# Patient Record
Sex: MALE | Race: WHITE | Employment: OTHER | URBAN - METROPOLITAN AREA
[De-identification: names, ages, dates, MRNs, and addresses within clinical notes are randomized per-mention and may not be internally consistent; named-entity substitution may affect disease eponyms.]

---

## 2020-02-18 ENCOUNTER — APPOINTMENT (OUTPATIENT)
Dept: GENERAL RADIOLOGY | Age: 70
DRG: 391 | End: 2020-02-18
Payer: MEDICARE

## 2020-02-18 ENCOUNTER — APPOINTMENT (OUTPATIENT)
Dept: CT IMAGING | Age: 70
DRG: 391 | End: 2020-02-18
Payer: MEDICARE

## 2020-02-18 ENCOUNTER — HOSPITAL ENCOUNTER (INPATIENT)
Age: 70
LOS: 2 days | Discharge: HOME | DRG: 391 | End: 2020-02-20
Attending: INTERNAL MEDICINE | Admitting: INTERNAL MEDICINE
Payer: MEDICARE

## 2020-02-18 PROBLEM — E87.20 METABOLIC ACIDEMIA: Status: ACTIVE | Noted: 2020-02-18

## 2020-02-18 LAB
ALBUMIN SERPL-MCNC: 4.6 G/DL (ref 3.4–5)
ALP BLD-CCNC: 89 U/L (ref 40–129)
ALT SERPL-CCNC: 19 U/L (ref 10–40)
ANION GAP SERPL CALCULATED.3IONS-SCNC: 17 MMOL/L (ref 3–16)
ANION GAP SERPL CALCULATED.3IONS-SCNC: 21 MMOL/L (ref 3–16)
AST SERPL-CCNC: 16 U/L (ref 15–37)
BASE EXCESS VENOUS: -6.5 MMOL/L
BASOPHILS ABSOLUTE: 0 K/UL (ref 0–0.2)
BASOPHILS RELATIVE PERCENT: 0.3 %
BETA-HYDROXYBUTYRATE: 1.95 MMOL/L (ref 0–0.27)
BILIRUB SERPL-MCNC: 0.4 MG/DL (ref 0–1)
BILIRUBIN DIRECT: <0.2 MG/DL (ref 0–0.3)
BILIRUBIN URINE: NEGATIVE
BILIRUBIN, INDIRECT: NORMAL MG/DL (ref 0–1)
BLOOD, URINE: NEGATIVE
BUN BLDV-MCNC: 22 MG/DL (ref 7–20)
BUN BLDV-MCNC: 22 MG/DL (ref 7–20)
CALCIUM SERPL-MCNC: 8.7 MG/DL (ref 8.3–10.6)
CALCIUM SERPL-MCNC: 9.6 MG/DL (ref 8.3–10.6)
CARBOXYHEMOGLOBIN: 0.7 %
CHLORIDE BLD-SCNC: 105 MMOL/L (ref 99–110)
CHLORIDE BLD-SCNC: 107 MMOL/L (ref 99–110)
CLARITY: CLEAR
CO2: 15 MMOL/L (ref 21–32)
CO2: 17 MMOL/L (ref 21–32)
COLOR: YELLOW
CREAT SERPL-MCNC: 1.3 MG/DL (ref 0.8–1.3)
CREAT SERPL-MCNC: 1.3 MG/DL (ref 0.8–1.3)
EOSINOPHILS ABSOLUTE: 0 K/UL (ref 0–0.6)
EOSINOPHILS RELATIVE PERCENT: 0.1 %
EPITHELIAL CELLS, UA: 0 /HPF (ref 0–5)
GFR AFRICAN AMERICAN: >60
GFR AFRICAN AMERICAN: >60
GFR NON-AFRICAN AMERICAN: 55
GFR NON-AFRICAN AMERICAN: 55
GLUCOSE BLD-MCNC: 173 MG/DL (ref 70–99)
GLUCOSE BLD-MCNC: 178 MG/DL (ref 70–99)
GLUCOSE BLD-MCNC: 179 MG/DL (ref 70–99)
GLUCOSE BLD-MCNC: 182 MG/DL (ref 70–99)
GLUCOSE BLD-MCNC: 183 MG/DL (ref 70–99)
GLUCOSE BLD-MCNC: 212 MG/DL (ref 70–99)
GLUCOSE URINE: >=1000 MG/DL
HCO3 VENOUS: 20 MMOL/L (ref 23–29)
HCT VFR BLD CALC: 42.6 % (ref 40.5–52.5)
HEMOGLOBIN: 13.7 G/DL (ref 13.5–17.5)
HYALINE CASTS: 0 /LPF (ref 0–8)
KETONES, URINE: 40 MG/DL
LACTIC ACID: 1.4 MMOL/L (ref 0.4–2)
LEUKOCYTE ESTERASE, URINE: NEGATIVE
LIPASE: 40 U/L (ref 13–60)
LYMPHOCYTES ABSOLUTE: 0.5 K/UL (ref 1–5.1)
LYMPHOCYTES RELATIVE PERCENT: 3 %
MCH RBC QN AUTO: 25.9 PG (ref 26–34)
MCHC RBC AUTO-ENTMCNC: 32.2 G/DL (ref 31–36)
MCV RBC AUTO: 80.3 FL (ref 80–100)
METHEMOGLOBIN VENOUS: 1 %
MICROSCOPIC EXAMINATION: YES
MONOCYTES ABSOLUTE: 0.4 K/UL (ref 0–1.3)
MONOCYTES RELATIVE PERCENT: 2.7 %
NEUTROPHILS ABSOLUTE: 14.7 K/UL (ref 1.7–7.7)
NEUTROPHILS RELATIVE PERCENT: 93.9 %
NITRITE, URINE: NEGATIVE
O2 CONTENT, VEN: 7 ML/DL
O2 SAT, VEN: 38 %
O2 THERAPY: ABNORMAL
PCO2, VEN: 42.8 MMHG (ref 40–50)
PDW BLD-RTO: 16.2 % (ref 12.4–15.4)
PERFORMED ON: ABNORMAL
PH UA: 5 (ref 5–8)
PH VENOUS: 7.28 (ref 7.35–7.45)
PLATELET # BLD: 218 K/UL (ref 135–450)
PMV BLD AUTO: 9.5 FL (ref 5–10.5)
PO2, VEN: 26 MMHG
POTASSIUM REFLEX MAGNESIUM: 4.5 MMOL/L (ref 3.5–5.1)
POTASSIUM SERPL-SCNC: 4.3 MMOL/L (ref 3.5–5.1)
PROTEIN UA: ABNORMAL MG/DL
RBC # BLD: 5.3 M/UL (ref 4.2–5.9)
RBC UA: 1 /HPF (ref 0–4)
SODIUM BLD-SCNC: 141 MMOL/L (ref 136–145)
SODIUM BLD-SCNC: 141 MMOL/L (ref 136–145)
SPECIFIC GRAVITY UA: >1.03 (ref 1–1.03)
TCO2 CALC VENOUS: 21 MMOL/L
TOTAL PROTEIN: 8 G/DL (ref 6.4–8.2)
TROPONIN: <0.01 NG/ML
URINE REFLEX TO CULTURE: ABNORMAL
URINE TYPE: ABNORMAL
UROBILINOGEN, URINE: 0.2 E.U./DL
WBC # BLD: 15.7 K/UL (ref 4–11)
WBC UA: 0 /HPF (ref 0–5)

## 2020-02-18 PROCEDURE — G0480 DRUG TEST DEF 1-7 CLASSES: HCPCS

## 2020-02-18 PROCEDURE — 84484 ASSAY OF TROPONIN QUANT: CPT

## 2020-02-18 PROCEDURE — 82803 BLOOD GASES ANY COMBINATION: CPT

## 2020-02-18 PROCEDURE — 6370000000 HC RX 637 (ALT 250 FOR IP): Performed by: INTERNAL MEDICINE

## 2020-02-18 PROCEDURE — 80048 BASIC METABOLIC PNL TOTAL CA: CPT

## 2020-02-18 PROCEDURE — 87040 BLOOD CULTURE FOR BACTERIA: CPT

## 2020-02-18 PROCEDURE — 71046 X-RAY EXAM CHEST 2 VIEWS: CPT

## 2020-02-18 PROCEDURE — 6360000002 HC RX W HCPCS: Performed by: PHYSICIAN ASSISTANT

## 2020-02-18 PROCEDURE — 2580000003 HC RX 258: Performed by: PHYSICIAN ASSISTANT

## 2020-02-18 PROCEDURE — 83690 ASSAY OF LIPASE: CPT

## 2020-02-18 PROCEDURE — 80076 HEPATIC FUNCTION PANEL: CPT

## 2020-02-18 PROCEDURE — 6360000002 HC RX W HCPCS: Performed by: INTERNAL MEDICINE

## 2020-02-18 PROCEDURE — 93005 ELECTROCARDIOGRAM TRACING: CPT | Performed by: EMERGENCY MEDICINE

## 2020-02-18 PROCEDURE — 82010 KETONE BODYS QUAN: CPT

## 2020-02-18 PROCEDURE — 96374 THER/PROPH/DIAG INJ IV PUSH: CPT

## 2020-02-18 PROCEDURE — 96361 HYDRATE IV INFUSION ADD-ON: CPT

## 2020-02-18 PROCEDURE — 82550 ASSAY OF CK (CPK): CPT

## 2020-02-18 PROCEDURE — 2580000003 HC RX 258: Performed by: INTERNAL MEDICINE

## 2020-02-18 PROCEDURE — 81001 URINALYSIS AUTO W/SCOPE: CPT

## 2020-02-18 PROCEDURE — 83605 ASSAY OF LACTIC ACID: CPT

## 2020-02-18 PROCEDURE — 84100 ASSAY OF PHOSPHORUS: CPT

## 2020-02-18 PROCEDURE — 36415 COLL VENOUS BLD VENIPUNCTURE: CPT

## 2020-02-18 PROCEDURE — 85025 COMPLETE CBC W/AUTO DIFF WBC: CPT

## 2020-02-18 PROCEDURE — 99285 EMERGENCY DEPT VISIT HI MDM: CPT

## 2020-02-18 PROCEDURE — 2000000000 HC ICU R&B

## 2020-02-18 PROCEDURE — 74176 CT ABD & PELVIS W/O CONTRAST: CPT

## 2020-02-18 PROCEDURE — 6370000000 HC RX 637 (ALT 250 FOR IP): Performed by: PHYSICIAN ASSISTANT

## 2020-02-18 PROCEDURE — 83735 ASSAY OF MAGNESIUM: CPT

## 2020-02-18 RX ORDER — SODIUM CHLORIDE 9 MG/ML
INJECTION, SOLUTION INTRAVENOUS CONTINUOUS
Status: DISCONTINUED | OUTPATIENT
Start: 2020-02-18 | End: 2020-02-19 | Stop reason: ALTCHOICE

## 2020-02-18 RX ORDER — ATORVASTATIN CALCIUM 80 MG/1
80 TABLET, FILM COATED ORAL NIGHTLY
COMMUNITY

## 2020-02-18 RX ORDER — M-VIT,TX,IRON,MINS/CALC/FOLIC 27MG-0.4MG
1 TABLET ORAL DAILY
Status: DISCONTINUED | OUTPATIENT
Start: 2020-02-19 | End: 2020-02-20 | Stop reason: HOSPADM

## 2020-02-18 RX ORDER — AMLODIPINE BESYLATE 10 MG/1
5 TABLET ORAL DAILY
COMMUNITY

## 2020-02-18 RX ORDER — GLIPIZIDE 10 MG/1
10 TABLET ORAL
COMMUNITY

## 2020-02-18 RX ORDER — DEXTROSE AND SODIUM CHLORIDE 5; .45 G/100ML; G/100ML
INJECTION, SOLUTION INTRAVENOUS CONTINUOUS PRN
Status: DISCONTINUED | OUTPATIENT
Start: 2020-02-18 | End: 2020-02-20 | Stop reason: HOSPADM

## 2020-02-18 RX ORDER — NICOTINE POLACRILEX 4 MG
15 LOZENGE BUCCAL PRN
Status: DISCONTINUED | OUTPATIENT
Start: 2020-02-18 | End: 2020-02-19 | Stop reason: SDUPTHER

## 2020-02-18 RX ORDER — FAMOTIDINE 20 MG/1
20 TABLET, FILM COATED ORAL 2 TIMES DAILY
Status: DISCONTINUED | OUTPATIENT
Start: 2020-02-18 | End: 2020-02-18 | Stop reason: ALTCHOICE

## 2020-02-18 RX ORDER — DEXTROSE MONOHYDRATE 25 G/50ML
12.5 INJECTION, SOLUTION INTRAVENOUS PRN
Status: DISCONTINUED | OUTPATIENT
Start: 2020-02-18 | End: 2020-02-20 | Stop reason: HOSPADM

## 2020-02-18 RX ORDER — SODIUM CHLORIDE 9 MG/ML
30 INJECTION, SOLUTION INTRAVENOUS ONCE
Status: DISCONTINUED | OUTPATIENT
Start: 2020-02-18 | End: 2020-02-19

## 2020-02-18 RX ORDER — POTASSIUM CHLORIDE 20 MEQ/1
40 TABLET, EXTENDED RELEASE ORAL PRN
Status: DISCONTINUED | OUTPATIENT
Start: 2020-02-18 | End: 2020-02-18 | Stop reason: SDUPTHER

## 2020-02-18 RX ORDER — OMEPRAZOLE 20 MG/1
20 CAPSULE, DELAYED RELEASE ORAL DAILY
COMMUNITY

## 2020-02-18 RX ORDER — ASPIRIN 81 MG/1
81 TABLET, CHEWABLE ORAL DAILY
Status: DISCONTINUED | OUTPATIENT
Start: 2020-02-19 | End: 2020-02-20 | Stop reason: HOSPADM

## 2020-02-18 RX ORDER — M-VIT,TX,IRON,MINS/CALC/FOLIC 27MG-0.4MG
1 TABLET ORAL DAILY
COMMUNITY

## 2020-02-18 RX ORDER — MAGNESIUM SULFATE 1 G/100ML
1 INJECTION INTRAVENOUS PRN
Status: DISCONTINUED | OUTPATIENT
Start: 2020-02-18 | End: 2020-02-18 | Stop reason: SDUPTHER

## 2020-02-18 RX ORDER — POTASSIUM CHLORIDE 7.45 MG/ML
10 INJECTION INTRAVENOUS PRN
Status: DISCONTINUED | OUTPATIENT
Start: 2020-02-18 | End: 2020-02-18 | Stop reason: SDUPTHER

## 2020-02-18 RX ORDER — PANTOPRAZOLE SODIUM 40 MG/1
40 TABLET, DELAYED RELEASE ORAL
Status: DISCONTINUED | OUTPATIENT
Start: 2020-02-19 | End: 2020-02-20 | Stop reason: HOSPADM

## 2020-02-18 RX ORDER — DEXTROSE MONOHYDRATE 25 G/50ML
12.5 INJECTION, SOLUTION INTRAVENOUS PRN
Status: DISCONTINUED | OUTPATIENT
Start: 2020-02-18 | End: 2020-02-19 | Stop reason: SDUPTHER

## 2020-02-18 RX ORDER — SENNOSIDES 8.6 MG
650 CAPSULE ORAL EVERY 8 HOURS PRN
COMMUNITY

## 2020-02-18 RX ORDER — MAGNESIUM SULFATE 1 G/100ML
1 INJECTION INTRAVENOUS PRN
Status: DISCONTINUED | OUTPATIENT
Start: 2020-02-18 | End: 2020-02-20 | Stop reason: HOSPADM

## 2020-02-18 RX ORDER — ONDANSETRON 2 MG/ML
4 INJECTION INTRAMUSCULAR; INTRAVENOUS EVERY 6 HOURS PRN
Status: DISCONTINUED | OUTPATIENT
Start: 2020-02-18 | End: 2020-02-20 | Stop reason: HOSPADM

## 2020-02-18 RX ORDER — DEXTROSE AND SODIUM CHLORIDE 5; .45 G/100ML; G/100ML
INJECTION, SOLUTION INTRAVENOUS CONTINUOUS
Status: DISCONTINUED | OUTPATIENT
Start: 2020-02-18 | End: 2020-02-19 | Stop reason: ALTCHOICE

## 2020-02-18 RX ORDER — SODIUM CHLORIDE 0.9 % (FLUSH) 0.9 %
10 SYRINGE (ML) INJECTION PRN
Status: DISCONTINUED | OUTPATIENT
Start: 2020-02-18 | End: 2020-02-20 | Stop reason: HOSPADM

## 2020-02-18 RX ORDER — ACETAMINOPHEN 325 MG/1
650 TABLET ORAL EVERY 4 HOURS PRN
Status: DISCONTINUED | OUTPATIENT
Start: 2020-02-18 | End: 2020-02-20 | Stop reason: HOSPADM

## 2020-02-18 RX ORDER — 0.9 % SODIUM CHLORIDE 0.9 %
1000 INTRAVENOUS SOLUTION INTRAVENOUS ONCE
Status: COMPLETED | OUTPATIENT
Start: 2020-02-18 | End: 2020-02-18

## 2020-02-18 RX ORDER — ONDANSETRON 4 MG/1
4-8 TABLET, ORALLY DISINTEGRATING ORAL EVERY 8 HOURS PRN
Qty: 12 TABLET | Refills: 0 | Status: SHIPPED | OUTPATIENT
Start: 2020-02-18 | End: 2020-02-20 | Stop reason: HOSPADM

## 2020-02-18 RX ORDER — SODIUM CHLORIDE 0.9 % (FLUSH) 0.9 %
10 SYRINGE (ML) INJECTION EVERY 12 HOURS SCHEDULED
Status: DISCONTINUED | OUTPATIENT
Start: 2020-02-18 | End: 2020-02-20 | Stop reason: HOSPADM

## 2020-02-18 RX ORDER — ONDANSETRON 2 MG/ML
4 INJECTION INTRAMUSCULAR; INTRAVENOUS ONCE
Status: COMPLETED | OUTPATIENT
Start: 2020-02-18 | End: 2020-02-18

## 2020-02-18 RX ORDER — DEXTROSE MONOHYDRATE 50 MG/ML
100 INJECTION, SOLUTION INTRAVENOUS PRN
Status: DISCONTINUED | OUTPATIENT
Start: 2020-02-18 | End: 2020-02-19 | Stop reason: SDUPTHER

## 2020-02-18 RX ORDER — LOSARTAN POTASSIUM 50 MG/1
50 TABLET ORAL DAILY
Status: ON HOLD | COMMUNITY
End: 2021-02-05 | Stop reason: HOSPADM

## 2020-02-18 RX ORDER — POTASSIUM CHLORIDE 7.45 MG/ML
10 INJECTION INTRAVENOUS PRN
Status: DISCONTINUED | OUTPATIENT
Start: 2020-02-18 | End: 2020-02-20 | Stop reason: HOSPADM

## 2020-02-18 RX ORDER — INSULIN GLARGINE 100 [IU]/ML
0.25 INJECTION, SOLUTION SUBCUTANEOUS DAILY
Status: DISCONTINUED | OUTPATIENT
Start: 2020-02-19 | End: 2020-02-20 | Stop reason: HOSPADM

## 2020-02-18 RX ADMIN — METOPROLOL TARTRATE 25 MG: 25 TABLET ORAL at 23:34

## 2020-02-18 RX ADMIN — SODIUM CHLORIDE 3.39 UNITS/HR: 9 INJECTION, SOLUTION INTRAVENOUS at 21:09

## 2020-02-18 RX ADMIN — SODIUM CHLORIDE, PRESERVATIVE FREE 10 ML: 5 INJECTION INTRAVENOUS at 23:27

## 2020-02-18 RX ADMIN — CEFTRIAXONE 2 G: 2 INJECTION, POWDER, FOR SOLUTION INTRAMUSCULAR; INTRAVENOUS at 23:35

## 2020-02-18 RX ADMIN — DEXTROSE AND SODIUM CHLORIDE: 5; 450 INJECTION, SOLUTION INTRAVENOUS at 21:08

## 2020-02-18 RX ADMIN — ONDANSETRON 4 MG: 2 INJECTION INTRAMUSCULAR; INTRAVENOUS at 16:28

## 2020-02-18 RX ADMIN — SODIUM CHLORIDE 1000 ML: 9 INJECTION, SOLUTION INTRAVENOUS at 16:28

## 2020-02-18 RX ADMIN — ACETAMINOPHEN 650 MG: 325 TABLET ORAL at 21:16

## 2020-02-18 RX ADMIN — SODIUM CHLORIDE 3.57 UNITS/HR: 9 INJECTION, SOLUTION INTRAVENOUS at 22:00

## 2020-02-18 SDOH — HEALTH STABILITY: MENTAL HEALTH: HOW OFTEN DO YOU HAVE A DRINK CONTAINING ALCOHOL?: NEVER

## 2020-02-18 ASSESSMENT — PAIN SCALES - GENERAL
PAINLEVEL_OUTOF10: 0

## 2020-02-18 NOTE — ED PROVIDER NOTES
[address] [facility]      Pt Name: Quentin Gill  MRN: [id number]  [place] 1950  Date of evaluation: 2/18/2020  Provider: Cori Yee, [address] [place] [address]  Chief Complaint   Patient presents with    Emesis     emesis since this am.  family states pt is throwing up bile       I have fully participated in the care of Quentin Gill and have had a face-to-face evaluation. I have reviewed and agree with all pertinent clinical information, and midlevel provider's history, and physical exam. I have also reviewed the labs, EKG, and imaging studies and treatment plan. I have also reviewed and agree with the medications, allergies and past medical history section for this Quentin Gill. I agree with the diagnosis, and I concur. Past Medical History:   Diagnosis Date    Cerebral artery occlusion with cerebral infarction ([facility] [address] [facility]     Diabetes mellitus ([facility] [address]     Heart attack ([facility] [address] [facility]     Hypertension        MDM:  Quentin Gill is a 71 y.o. male who presents with nausea vomiting. He is vomited 3 times. He woke this morning feeling bad. He denies any abdominal pain. He has been lightheaded. Nothing makes it better or worse. Scribes is severe. He is diabetic. Physical exam reveals mild tachycardia but otherwise normal exam.  There is no abdominal tenderness. His work-up revealed a mildly elevated white count. His temperature was normal.  His work-up was negative for cause for his pain. Therefore, patient was discharged with symptomatic treatment instructed follow with his doctors in 3 to 5 days and return if any problems.     Vitals:    02/18/20 1853   BP:    Pulse:    Resp:    Temp: 98.2 °F (36.8 °C)   SpO2:        Lab results  Labs Reviewed   CBC WITH AUTO DIFFERENTIAL - Abnormal; Notable for the following components:       Result Value    WBC 15.7 (*)     MCH 25.9 (*)     RDW 16.2 (*)     Neutrophils Absolute 14.7 (*)     Lymphocytes JACQUELINE FARMER - [facility]  [address] [facility], [place] [facility] [address]   Phone [phone]   BETA-HYDROXYBUTYRATE   BLOOD GAS, VENOUS   BASIC METABOLIC PANEL         Radiology results  CT ABDOMEN PELVIS WO CONTRAST   Final Result   No significant finding in the abdomen or pelvis. XR CHEST STANDARD (2 VW)   Final Result   No acute abnormality             See discharge instructions for specific medications, discharge information, and treatments. They were verbally instructed to return to emergency if any problems. Medications   0.9 % sodium chloride bolus (0 mLs Intravenous Stopped 2/18/20 2699)   ondansetron (ZOFRAN) injection 4 mg (4 mg Intravenous Given 2/18/20 1628)       New Prescriptions    ONDANSETRON (ZOFRAN ODT) 4 MG DISINTEGRATING TABLET    Take 1-2 tablets by mouth every 8 hours as needed for Nausea or Vomiting       The patient's blood pressure was not found to be elevated according to CMS/Medicare and the Affordable Care Act/ObamaCare criteria.      IMPRESSIONS:  1. Nausea and vomiting, intractability of vomiting not specified, unspecified vomiting type               Mj Torres DO  02/20/20 8534

## 2020-02-18 NOTE — ED TRIAGE NOTES
Pt c/o n/v that started this am. Multiple episodes. Pt unable to keep any food down. denies abd pain or fever.

## 2020-02-18 NOTE — ED PROVIDER NOTES
were addressed in the HPI. REVIEW OF SYSTEMS    (2-9 systems for level 4, 10 or more for level 5)     Review of Systems    Positives and Pertinent negatives as per HPI. Except as noted above in the ROS, all other systems were reviewed and negative. PAST MEDICAL HISTORY     Past Medical History:   Diagnosis Date    Cerebral artery occlusion with cerebral infarction ([facility] [address]     Diabetes mellitus ([facility] [address] [facility]     Heart attack ([facility] [address]     Hypertension          SURGICAL HISTORY     Past Surgical History:   Procedure Laterality Date    CARDIAC SURGERY      CHOLECYSTECTOMY      HERNIA REPAIR           CURRENTMEDICATIONS       Current Discharge Medication List      CONTINUE these medications which have NOT CHANGED    Details   metoprolol tartrate (LOPRESSOR) 25 MG tablet Take 25 mg by mouth 2 times daily      amLODIPine (NORVASC) 10 MG tablet Take 5 mg by mouth daily      atorvastatin (LIPITOR) 80 MG tablet Take 80 mg by mouth daily      omeprazole (PRILOSEC) 20 MG delayed release capsule Take 20 mg by mouth Daily      metFORMIN (GLUCOPHAGE) 1000 MG tablet Take 1,000 mg by mouth 2 times daily (with meals)      aspirin 81 MG tablet Take 81 mg by mouth daily      losartan (COZAAR) 50 MG tablet Take 50 mg by mouth daily      glipiZIDE (GLUCOTROL) 10 MG tablet Take 10 mg by mouth 2 times daily (before meals)      acetaminophen (TYLENOL) 650 MG extended release tablet Take 650 mg by mouth every 8 hours as needed for Pain      Multiple Vitamins-Minerals (THERAPEUTIC MULTIVITAMIN-MINERALS) tablet Take 1 tablet by mouth daily               ALLERGIES     Lovastatin    FAMILYHISTORY     History reviewed. No pertinent family history.        SOCIAL HISTORY       Social History     Tobacco Use    Smoking status: Never Smoker    Smokeless tobacco: Never Used   Substance Use Topics    Alcohol use: Never     Frequency: Never    Drug use: Never       SCREENINGS    Concan Coma Scale  Eye Opening: Spontaneous  Best Verbal Response: Oriented  Best Motor Response: Obeys commands  Barby Coma Scale Score: 15        PHYSICAL EXAM    (up to 7 for level 4, 8 or more for level 5)     ED Triage Vitals   BP Temp Temp Source Pulse Resp SpO2 Height Weight   02/18/20 1447 02/18/20 1447 02/18/20 1447 02/18/20 1447 02/18/20 1447 02/18/20 1447 02/18/20 1445 02/18/20 1445   (!) 151/94 98.4 °F (36.9 °C) Oral 120 17 95 % 6' (1.829 m) 212 lb 11.9 oz (96.5 kg)       Physical Exam  Vitals signs and nursing note reviewed. Constitutional:       Appearance: Normal appearance. He is well-developed. HENT:      Head: Normocephalic and atraumatic. Right Ear: External ear normal.      Left Ear: External ear normal.   Eyes:      General: No scleral icterus. Right eye: No discharge. Left eye: No discharge. Conjunctiva/sclera: Conjunctivae normal.   Neck:      Musculoskeletal: Normal range of motion and neck supple. Cardiovascular:      Rate and Rhythm: Regular rhythm. Tachycardia present. Pulmonary:      Effort: Pulmonary effort is normal.      Breath sounds: Normal breath sounds. Abdominal:      General: Abdomen is flat. Bowel sounds are normal.      Palpations: Abdomen is soft. There is no mass. Tenderness: There is no abdominal tenderness. There is no right CVA tenderness, left CVA tenderness, guarding or rebound. Musculoskeletal: Normal range of motion. Skin:     General: Skin is warm and dry. Neurological:      General: No focal deficit present. Mental Status: He is alert and oriented to person, place, and time. Mental status is at baseline. Psychiatric:         Mood and Affect: Mood normal.         Behavior: Behavior normal.         Thought Content:  Thought content normal.         Judgment: Judgment normal.         DIAGNOSTIC RESULTS   LABS:    Labs Reviewed   CBC WITH AUTO DIFFERENTIAL - Abnormal; Notable for the following components:       Result Value    WBC 15.7 (*)     MCH 25.9 (*)     RDW 16.2 (*) Neutrophils Absolute 14.7 (*)     Lymphocytes Absolute 0.5 (*)     All other components within normal limits    Narrative:     Performed at:  [facility]  [address], [place] [facility] [employer]   Phone [phone]   BASIC METABOLIC PANEL W/ REFLEX TO MG FOR LOW K - Abnormal; Notable for the following components:    CO2 15 (*)     Anion Gap 21 (*)     Glucose 212 (*)     BUN 22 (*)     GFR Non- 55 (*)     All other components within normal limits    Narrative:     Performed at:  [place] [address], [facility] [employer]   Phone [phone]   URINE RT REFLEX TO CULTURE - Abnormal; Notable for the following components:    Glucose, Ur >=1000 (*)     Ketones, Urine 40 (*)     Protein, UA TRACE (*)     All other components within normal limits    Narrative:     Performed at:  [address] [facility] [employer]   Phone [phone]   BETA-HYDROXYBUTYRATE - Abnormal; Notable for the following components:    Beta-Hydroxybutyrate 1.95 (*)     All other components within normal limits    Narrative:     Performed at:  [address] [facility] [employer]   Phone [phone]   BLOOD GAS, VENOUS - Abnormal; Notable for the following components:    pH, Ramón 7.281 (*)     HCO3, Venous 20 (*)     All other components within normal limits    Narrative:     Performed at:  [address] [facility] [employer]   Phone [phone]   BASIC METABOLIC PANEL - Abnormal; Notable for the following components:    CO2 17 (*)     Anion Gap 17 (*)     Glucose 182 (*)     BUN 22 (*)     GFR Non- 55 (*)     All other components within normal limits    Narrative:     Performed at:  [facility]  [address] [place] [address] Phone [phone]   POCT GLUCOSE - Abnormal; Notable for the following components:    POC Glucose 183 (*)     All other components within normal limits    Narrative:     Performed at:  [facility]  [address], [facility] [place] [address]   Phone [phone]   POCT GLUCOSE - Abnormal; Notable for the following components:    POC Glucose 173 (*)     All other components within normal limits    Narrative:     Performed at:  [facility]  [address], [facility] [place] [address]   Phone [phone]   POCT GLUCOSE - Abnormal; Notable for the following components:    POC Glucose 179 (*)     All other components within normal limits    Narrative:     Performed at:  [facility]  [address] [facility] [place] [address]   Phone [phone]   POCT GLUCOSE - Abnormal; Notable for the following components:    POC Glucose 178 (*)     All other components within normal limits    Narrative:     Performed at:  [facility]  [address], [facility] [place] [address]   Phone [phone]   CULTURE, BLOOD 2   TROPONIN    Narrative:     Performed at:  [facility]  [address] [facility] [place] [address]   Phone [phone]   HEPATIC FUNCTION PANEL    Narrative:     Performed at:  [facility]  [address] [place] [address] [place] [address]   Phone [phone]   LIPASE    Narrative:     Performed at:  [facility]  [address] [place] [address] [facility] [place] [address]   Phone [phone]   LACTIC ACID, PLASMA    Narrative:     Performed at:  [facility]  [address], [facility] [place] [address]   Phone [phone]   MICROSCOPIC URINALYSIS    Narrative:     Performed at:  [facility]  [address] [place] [address] [facility] [employer] [address]   Phone syndesmophytes in the thoracic spine.      No acute abnormality           PROCEDURES   Unless otherwise noted below, none     Procedures    CRITICAL CARE TIME   N/A    CONSULTS:  None      EMERGENCY DEPARTMENT COURSE and DIFFERENTIAL DIAGNOSIS/MDM:   Vitals:    Vitals:    02/18/20 2155 02/18/20 2200 02/18/20 2300 02/18/20 2345   BP:  133/77 (!) 121/58 115/69   Pulse: 125  115 108   Resp:   22 14   Temp:    98.2 °F (36.8 °C)   TempSrc:    Oral   SpO2:   93% 94%   Weight:       Height:           Patient was given the following medications:  Medications   glucose (GLUTOSE) 40 % oral gel 15 g (has no administration in time range)   dextrose 50 % IV solution (has no administration in time range)   glucagon (rDNA) injection 1 mg (has no administration in time range)   dextrose 5 % solution (has no administration in time range)   insulin regular (HUMULIN R;NOVOLIN R) 100 Units in sodium chloride 0.9 % 100 mL infusion (3.54 Units/hr Intravenous Rate/Dose Change 2/18/20 2335)   aspirin chewable tablet 81 mg (has no administration in time range)   metoprolol tartrate (LOPRESSOR) tablet 25 mg (25 mg Oral Given 2/18/20 2334)   therapeutic multivitamin-minerals 1 tablet (has no administration in time range)   pantoprazole (PROTONIX) tablet 40 mg (has no administration in time range)   sodium chloride flush 0.9 % injection 10 mL (10 mLs Intravenous Given 2/18/20 2327)   sodium chloride flush 0.9 % injection 10 mL (has no administration in time range)   magnesium hydroxide (MILK OF MAGNESIA) 400 MG/5ML suspension 30 mL (has no administration in time range)   ondansetron (ZOFRAN) injection 4 mg (has no administration in time range)   enoxaparin (LOVENOX) injection 40 mg (has no administration in time range)   acetaminophen (TYLENOL) tablet 650 mg (650 mg Oral Given 2/18/20 2116)   dextrose 50 % IV solution (has no administration in time range)   potassium chloride 10 mEq/100 mL IVPB (Peripheral Line) (has no administration in time range)   magnesium sulfate 1 g in dextrose 5% 100 mL IVPB (has no administration in time range)   sodium phosphate 10 mmol in dextrose 5 % 250 mL IVPB (has no administration in time range)     Or   sodium phosphate 15 mmol in dextrose 5 % 250 mL IVPB (has no administration in time range)     Or   sodium phosphate 20 mmol in dextrose 5 % 500 mL IVPB (has no administration in time range)   0.9 % sodium chloride infusion (2,328 mLs Intravenous Not Given 2/18/20 2325)   dextrose 5 % and 0.45 % sodium chloride infusion (has no administration in time range)   0.9 % sodium chloride infusion ( Intravenous Not Given 2/18/20 2323)   insulin glargine (LANTUS) injection vial 23 Units (has no administration in time range)   cefTRIAXone (ROCEPHIN) 2 g IVPB in D5W 50ml minibag (2 g Intravenous New Bag 2/18/20 2335)   azithromycin (ZITHROMAX) 500 mg in D5W 250ml addavial (has no administration in time range)   dextrose 5 % and 0.45 % sodium chloride infusion ( Intravenous New Bag 2/18/20 2108)   0.9 % sodium chloride bolus (0 mLs Intravenous Stopped 2/18/20 1749)   ondansetron (ZOFRAN) injection 4 mg (4 mg Intravenous Given 2/18/20 1628)       The patient presenting with emesis x3. States emesis this morning, the afternoon and late afternoon. Patient has been nausea free here in the emergency room. Patient is a long-time diabetic currently on metformin. The patient's beta hydroxybutyrate 1.95. The patient's VBG pH is 7.28. The patient's urinalysis shows evidence greater 1000 glucosuria, ketones at 40. WBC 15.7 hemoglobin 13.7. The patient's initial CO2 15 and anion gap 21. He did receive 1 L saline and repeat BMP shows CO2 improved to 17 and anion gap improved to 17. Patient GFR remains 55. The patient's initial blood sugar 200 1:16 liter saline 182. The patient's troponin less than 0.01. DKA order set initiated. Case reviewed with attending physician who recommends admission. FINAL IMPRESSION      1.  Nausea

## 2020-02-18 NOTE — ED PROVIDER NOTES
Pt Name: Alfred Conroy  MRN: [id number]  [place] 1950  Date of evaluation: 2/18/2020    EKG Interpretation    The purpose of this note is for preliminary EKG interpretation only. This patient was seen independently by the mid-level provider and was not seen by this provider. EKG visualized preliminarily interpreted by myself shows sinus tachycardia with rate of 120, normal axis, normal intervals, no ST changes indicative of ischemia at this time.         Dhruv Corona MD  02/18/20 8400

## 2020-02-18 NOTE — ED NOTES
Acknowledged pt by pt's name. Verified pt by name and date of birth. Checked arm band, allergies, reviewed past medical history. Introduced myself to patient  Duration of ED plan of care explained to patient  Explained planned tests and procedures  Thanked patient for coming to [facility].    Asked if there was anything else I could do for the patient before exiting room. CB in reach.      Susannah Pallas, RN  02/18/20 2853

## 2020-02-19 PROBLEM — K52.9 ACUTE GASTROENTERITIS: Status: ACTIVE | Noted: 2020-02-19

## 2020-02-19 PROBLEM — E11.9 CONTROLLED TYPE 2 DIABETES MELLITUS WITHOUT COMPLICATION, WITHOUT LONG-TERM CURRENT USE OF INSULIN (HCC): Status: ACTIVE | Noted: 2020-02-19

## 2020-02-19 PROBLEM — R42 ACUTE ONSET OF SEVERE VERTIGO: Status: ACTIVE | Noted: 2020-02-19

## 2020-02-19 LAB
ALBUMIN SERPL-MCNC: 3.9 G/DL (ref 3.4–5)
ALP BLD-CCNC: 73 U/L (ref 40–129)
ALT SERPL-CCNC: 16 U/L (ref 10–40)
ANION GAP SERPL CALCULATED.3IONS-SCNC: 13 MMOL/L (ref 3–16)
ANION GAP SERPL CALCULATED.3IONS-SCNC: 16 MMOL/L (ref 3–16)
ANION GAP SERPL CALCULATED.3IONS-SCNC: 16 MMOL/L (ref 3–16)
APTT: 29.6 SEC (ref 24.2–36.2)
AST SERPL-CCNC: 14 U/L (ref 15–37)
BASOPHILS ABSOLUTE: 0 K/UL (ref 0–0.2)
BASOPHILS RELATIVE PERCENT: 0.5 %
BILIRUB SERPL-MCNC: 0.3 MG/DL (ref 0–1)
BILIRUBIN DIRECT: <0.2 MG/DL (ref 0–0.3)
BILIRUBIN, INDIRECT: ABNORMAL MG/DL (ref 0–1)
BUN BLDV-MCNC: 18 MG/DL (ref 7–20)
BUN BLDV-MCNC: 21 MG/DL (ref 7–20)
BUN BLDV-MCNC: 24 MG/DL (ref 7–20)
CALCIUM SERPL-MCNC: 8.5 MG/DL (ref 8.3–10.6)
CALCIUM SERPL-MCNC: 8.8 MG/DL (ref 8.3–10.6)
CALCIUM SERPL-MCNC: 8.8 MG/DL (ref 8.3–10.6)
CHLORIDE BLD-SCNC: 104 MMOL/L (ref 99–110)
CHLORIDE BLD-SCNC: 104 MMOL/L (ref 99–110)
CHLORIDE BLD-SCNC: 106 MMOL/L (ref 99–110)
CO2: 17 MMOL/L (ref 21–32)
CO2: 17 MMOL/L (ref 21–32)
CO2: 19 MMOL/L (ref 21–32)
CREAT SERPL-MCNC: 1.3 MG/DL (ref 0.8–1.3)
EKG ATRIAL RATE: 120 BPM
EKG DIAGNOSIS: NORMAL
EKG P AXIS: 34 DEGREES
EKG P-R INTERVAL: 148 MS
EKG Q-T INTERVAL: 298 MS
EKG QRS DURATION: 58 MS
EKG QTC CALCULATION (BAZETT): 421 MS
EKG R AXIS: -18 DEGREES
EKG T AXIS: 59 DEGREES
EKG VENTRICULAR RATE: 120 BPM
EOSINOPHILS ABSOLUTE: 0 K/UL (ref 0–0.6)
EOSINOPHILS RELATIVE PERCENT: 0.1 %
ESTIMATED AVERAGE GLUCOSE: 205.9 MG/DL
ETHANOL: NORMAL MG/DL (ref 0–0.08)
GFR AFRICAN AMERICAN: >60
GFR NON-AFRICAN AMERICAN: 55
GLUCOSE BLD-MCNC: 166 MG/DL (ref 70–99)
GLUCOSE BLD-MCNC: 195 MG/DL (ref 70–99)
GLUCOSE BLD-MCNC: 196 MG/DL (ref 70–99)
GLUCOSE BLD-MCNC: 216 MG/DL (ref 70–99)
GLUCOSE BLD-MCNC: 217 MG/DL (ref 70–99)
GLUCOSE BLD-MCNC: 231 MG/DL (ref 70–99)
GLUCOSE BLD-MCNC: 242 MG/DL (ref 70–99)
GLUCOSE BLD-MCNC: 247 MG/DL (ref 70–99)
GLUCOSE BLD-MCNC: 332 MG/DL (ref 70–99)
HBA1C MFR BLD: 8.8 %
HCT VFR BLD CALC: 38.9 % (ref 40.5–52.5)
HEMOGLOBIN: 12.5 G/DL (ref 13.5–17.5)
INR BLD: 1.1 (ref 0.86–1.14)
LIPASE: 20 U/L (ref 13–60)
LYMPHOCYTES ABSOLUTE: 0.3 K/UL (ref 1–5.1)
LYMPHOCYTES RELATIVE PERCENT: 3.8 %
MAGNESIUM: 1.7 MG/DL (ref 1.8–2.4)
MAGNESIUM: 2 MG/DL (ref 1.8–2.4)
MAGNESIUM: 2.6 MG/DL (ref 1.8–2.4)
MCH RBC QN AUTO: 26.1 PG (ref 26–34)
MCHC RBC AUTO-ENTMCNC: 32.1 G/DL (ref 31–36)
MCV RBC AUTO: 81.3 FL (ref 80–100)
MONOCYTES ABSOLUTE: 0.3 K/UL (ref 0–1.3)
MONOCYTES RELATIVE PERCENT: 4 %
NEUTROPHILS ABSOLUTE: 7.5 K/UL (ref 1.7–7.7)
NEUTROPHILS RELATIVE PERCENT: 91.6 %
OSMOLALITY: 296 MOSM/KG (ref 280–301)
PDW BLD-RTO: 16.2 % (ref 12.4–15.4)
PERFORMED ON: ABNORMAL
PHOSPHORUS: 2.3 MG/DL (ref 2.5–4.9)
PHOSPHORUS: 3.6 MG/DL (ref 2.5–4.9)
PHOSPHORUS: 3.8 MG/DL (ref 2.5–4.9)
PLATELET # BLD: 182 K/UL (ref 135–450)
PMV BLD AUTO: 9.2 FL (ref 5–10.5)
POTASSIUM SERPL-SCNC: 4.1 MMOL/L (ref 3.5–5.1)
POTASSIUM SERPL-SCNC: 4.2 MMOL/L (ref 3.5–5.1)
POTASSIUM SERPL-SCNC: 4.3 MMOL/L (ref 3.5–5.1)
PROTHROMBIN TIME: 12.8 SEC (ref 10–13.2)
RBC # BLD: 4.78 M/UL (ref 4.2–5.9)
REPORT: NORMAL
RESPIRATORY PANEL PCR: NORMAL
SALICYLATE, SERUM: <0.3 MG/DL (ref 15–30)
SODIUM BLD-SCNC: 136 MMOL/L (ref 136–145)
SODIUM BLD-SCNC: 137 MMOL/L (ref 136–145)
SODIUM BLD-SCNC: 139 MMOL/L (ref 136–145)
TOTAL CK: 44 U/L (ref 39–308)
TOTAL CK: 46 U/L (ref 39–308)
TOTAL PROTEIN: 6.8 G/DL (ref 6.4–8.2)
TROPONIN: <0.01 NG/ML
WBC # BLD: 8.2 K/UL (ref 4–11)

## 2020-02-19 PROCEDURE — 94760 N-INVAS EAR/PLS OXIMETRY 1: CPT

## 2020-02-19 PROCEDURE — 6370000000 HC RX 637 (ALT 250 FOR IP): Performed by: INTERNAL MEDICINE

## 2020-02-19 PROCEDURE — 2580000003 HC RX 258: Performed by: INTERNAL MEDICINE

## 2020-02-19 PROCEDURE — 84484 ASSAY OF TROPONIN QUANT: CPT

## 2020-02-19 PROCEDURE — 0100U HC RESPIRPTHGN MULT REV TRANS & AMP PRB TECH 21 TRGT: CPT

## 2020-02-19 PROCEDURE — 83735 ASSAY OF MAGNESIUM: CPT

## 2020-02-19 PROCEDURE — 36415 COLL VENOUS BLD VENIPUNCTURE: CPT

## 2020-02-19 PROCEDURE — 85025 COMPLETE CBC W/AUTO DIFF WBC: CPT

## 2020-02-19 PROCEDURE — 6370000000 HC RX 637 (ALT 250 FOR IP): Performed by: NURSE PRACTITIONER

## 2020-02-19 PROCEDURE — 82550 ASSAY OF CK (CPK): CPT

## 2020-02-19 PROCEDURE — 93010 ELECTROCARDIOGRAM REPORT: CPT | Performed by: INTERNAL MEDICINE

## 2020-02-19 PROCEDURE — 85610 PROTHROMBIN TIME: CPT

## 2020-02-19 PROCEDURE — 6360000002 HC RX W HCPCS: Performed by: INTERNAL MEDICINE

## 2020-02-19 PROCEDURE — 85730 THROMBOPLASTIN TIME PARTIAL: CPT

## 2020-02-19 PROCEDURE — 83036 HEMOGLOBIN GLYCOSYLATED A1C: CPT

## 2020-02-19 PROCEDURE — 83930 ASSAY OF BLOOD OSMOLALITY: CPT

## 2020-02-19 PROCEDURE — 80048 BASIC METABOLIC PNL TOTAL CA: CPT

## 2020-02-19 PROCEDURE — 2580000003 HC RX 258: Performed by: NURSE PRACTITIONER

## 2020-02-19 PROCEDURE — 1200000000 HC SEMI PRIVATE

## 2020-02-19 PROCEDURE — 84100 ASSAY OF PHOSPHORUS: CPT

## 2020-02-19 RX ORDER — DEXTROSE MONOHYDRATE 50 MG/ML
100 INJECTION, SOLUTION INTRAVENOUS PRN
Status: DISCONTINUED | OUTPATIENT
Start: 2020-02-19 | End: 2020-02-20 | Stop reason: HOSPADM

## 2020-02-19 RX ORDER — METOCLOPRAMIDE HYDROCHLORIDE 5 MG/ML
10 INJECTION INTRAMUSCULAR; INTRAVENOUS ONCE
Status: COMPLETED | OUTPATIENT
Start: 2020-02-19 | End: 2020-02-19

## 2020-02-19 RX ORDER — MAGNESIUM SULFATE 1 G/100ML
1 INJECTION INTRAVENOUS ONCE
Status: COMPLETED | OUTPATIENT
Start: 2020-02-19 | End: 2020-02-19

## 2020-02-19 RX ORDER — NICOTINE POLACRILEX 4 MG
15 LOZENGE BUCCAL PRN
Status: DISCONTINUED | OUTPATIENT
Start: 2020-02-19 | End: 2020-02-20 | Stop reason: HOSPADM

## 2020-02-19 RX ORDER — SODIUM CHLORIDE, SODIUM LACTATE, POTASSIUM CHLORIDE, CALCIUM CHLORIDE 600; 310; 30; 20 MG/100ML; MG/100ML; MG/100ML; MG/100ML
INJECTION, SOLUTION INTRAVENOUS CONTINUOUS
Status: DISCONTINUED | OUTPATIENT
Start: 2020-02-19 | End: 2020-02-19

## 2020-02-19 RX ORDER — ALPRAZOLAM 0.5 MG/1
0.5 TABLET ORAL ONCE
Status: COMPLETED | OUTPATIENT
Start: 2020-02-19 | End: 2020-02-19

## 2020-02-19 RX ORDER — DEXTROSE MONOHYDRATE 25 G/50ML
12.5 INJECTION, SOLUTION INTRAVENOUS PRN
Status: DISCONTINUED | OUTPATIENT
Start: 2020-02-19 | End: 2020-02-20 | Stop reason: HOSPADM

## 2020-02-19 RX ADMIN — METOPROLOL TARTRATE 25 MG: 25 TABLET ORAL at 08:25

## 2020-02-19 RX ADMIN — AZITHROMYCIN MONOHYDRATE 500 MG: 500 INJECTION, POWDER, LYOPHILIZED, FOR SOLUTION INTRAVENOUS at 00:50

## 2020-02-19 RX ADMIN — ASPIRIN 81 MG 81 MG: 81 TABLET ORAL at 08:25

## 2020-02-19 RX ADMIN — INSULIN LISPRO 8 UNITS: 100 INJECTION, SOLUTION INTRAVENOUS; SUBCUTANEOUS at 12:53

## 2020-02-19 RX ADMIN — METOCLOPRAMIDE 10 MG: 5 INJECTION, SOLUTION INTRAMUSCULAR; INTRAVENOUS at 01:25

## 2020-02-19 RX ADMIN — PANTOPRAZOLE SODIUM 40 MG: 40 TABLET, DELAYED RELEASE ORAL at 06:24

## 2020-02-19 RX ADMIN — INSULIN LISPRO 4 UNITS: 100 INJECTION, SOLUTION INTRAVENOUS; SUBCUTANEOUS at 06:24

## 2020-02-19 RX ADMIN — SODIUM CHLORIDE, PRESERVATIVE FREE 10 ML: 5 INJECTION INTRAVENOUS at 20:54

## 2020-02-19 RX ADMIN — MULTIPLE VITAMINS W/ MINERALS TAB 1 TABLET: TAB at 08:25

## 2020-02-19 RX ADMIN — ENOXAPARIN SODIUM 40 MG: 40 INJECTION SUBCUTANEOUS at 08:25

## 2020-02-19 RX ADMIN — SODIUM CHLORIDE, PRESERVATIVE FREE 10 ML: 5 INJECTION INTRAVENOUS at 09:00

## 2020-02-19 RX ADMIN — POTASSIUM CHLORIDE 10 MEQ: 7.46 INJECTION, SOLUTION INTRAVENOUS at 00:49

## 2020-02-19 RX ADMIN — MAGNESIUM SULFATE HEPTAHYDRATE 1 G: 1 INJECTION, SOLUTION INTRAVENOUS at 01:25

## 2020-02-19 RX ADMIN — INSULIN GLARGINE 23 UNITS: 100 INJECTION, SOLUTION SUBCUTANEOUS at 08:25

## 2020-02-19 RX ADMIN — METOPROLOL TARTRATE 25 MG: 25 TABLET ORAL at 20:50

## 2020-02-19 RX ADMIN — INSULIN LISPRO 4 UNITS: 100 INJECTION, SOLUTION INTRAVENOUS; SUBCUTANEOUS at 17:54

## 2020-02-19 RX ADMIN — SODIUM CHLORIDE, POTASSIUM CHLORIDE, SODIUM LACTATE AND CALCIUM CHLORIDE: 600; 310; 30; 20 INJECTION, SOLUTION INTRAVENOUS at 09:06

## 2020-02-19 RX ADMIN — INSULIN LISPRO 2 UNITS: 100 INJECTION, SOLUTION INTRAVENOUS; SUBCUTANEOUS at 20:50

## 2020-02-19 RX ADMIN — ALPRAZOLAM 0.5 MG: 0.5 TABLET ORAL at 01:24

## 2020-02-19 ASSESSMENT — PAIN SCALES - GENERAL
PAINLEVEL_OUTOF10: 0

## 2020-02-19 NOTE — H&P
History and Physical    Admit Date: 2/18/2020    Patient's PCP: Dr. Watkins [facility] primary care provider on file. Chief complaint:   Chief Complaint   Patient presents with    Emesis     emesis since this am.  family states pt is throwing up bile       HISTORY OF PRESENT ILLNESS:    This is a 71 y.o. male presenting with vomiting, 1 day, sudden onset, aggravated by meals, no relievers, associated with nausea, dizziness, diaphoresis, watery diarrhea. He denies fever, chills, rigors, night sweats, loss of appetite, hematemesis, bilious emesis, rectal bleeding, abdominal pain, headache, neck pain, neck stiffness, lateral weakness, loss of vision, loss of consciousness, dysuria, flank pain, hematuria. Past Medical / Surgical History:    Past Medical History:   Diagnosis Date    Cerebral artery occlusion with cerebral infarction ([facility] [address]     Diabetes mellitus ([facility] [address]     Heart attack ([facility] [address]     Hypertension        Past Surgical History:   Procedure Laterality Date    CARDIAC SURGERY      CHOLECYSTECTOMY      HERNIA REPAIR         Medications Prior to Admission:    No current facility-administered medications on file prior to encounter.       Current Outpatient Medications on File Prior to Encounter   Medication Sig Dispense Refill    metoprolol tartrate (LOPRESSOR) 25 MG tablet Take 25 mg by mouth 2 times daily      amLODIPine (NORVASC) 10 MG tablet Take 5 mg by mouth daily      atorvastatin (LIPITOR) 80 MG tablet Take 80 mg by mouth daily      omeprazole (PRILOSEC) 20 MG delayed release capsule Take 20 mg by mouth Daily      metFORMIN (GLUCOPHAGE) 1000 MG tablet Take 1,000 mg by mouth 2 times daily (with meals)      aspirin 81 MG tablet Take 81 mg by mouth daily      losartan (COZAAR) 50 MG tablet Take 50 mg by mouth daily      glipiZIDE (GLUCOTROL) 10 MG tablet Take 10 mg by mouth 2 times daily (before meals)      acetaminophen (TYLENOL) 650 MG extended release tablet Take 650 mg by mouth every 8 hours as

## 2020-02-19 NOTE — PROGRESS NOTES
Nutrition Assessment (Low Risk)    Type and Reason for Visit: Initial, Positive Nutrition Screen(ed)    Nutrition Recommendations:   Encouraged pt to contact Dietitian should any questions arise regarding diet     Nutrition Assessment:  Patient assessed for nutritional risk. Pt admitted following nausea & vomiting. Pt found to be in DKA with possible cause of N/V r/t gastroenteritis. Diet advanced to [facility]. Pt reported tolerance of diet. Lunch noted at 100% consumption this date. Pt & wife denied ed needs, reporting that they have attended 3 DM classes in 3 different states since diagnosis. Questions answered with an overview of diet therapy for DM. Comprehended per feedback. Deemed to be at low risk at this time. Will continue to monitor for changes in status. Of note, pt & wife travel all over the [place] as [place] [facility] Mrs Frank Garay.      Malnutrition Assessment:  · Malnutrition Status: No malnutrition    Nutrition Risk Level   Risk Level: Low    Nutrition Diagnosis:   · Problem: No nutrition diagnosis at this time    Nutrition Intervention:  Food and/or Delivery: Continue current diet  Nutrition Education/Counseling/Coordination of Care:  Continued Inpatient Monitoring, Education declined      Electronically signed by Milind Mcmanus RD, LD on 2/19/20 at 2:06 PM    Contact Number: [phone]

## 2020-02-19 NOTE — PROGRESS NOTES
Hospital Medicine Progress Note      Admit Date: 2/18/2020         Overnight Events: NONE    CC: F/U for Nausea, vomiting    HPI:   This is a 59-year-old male with a history of hypertension, diabetes and previous CVA presents to the ED with complaints of nausea vomiting and diarrhea. Was found to be acidotic with elevated blood sugar. Initially thought to be in DKA. Yesterday morning woke up and felt dizzy, had to lay back down. Then got up and took his medications in which he kept down for about 20 min and then had an episode of vomiting. He waited ~2 hours and said that he attempted toast but this was \"up chucked\" aslo. He did have 2 bouts of diarrhea yesterday prior to arrival. No recent abx use. Interval History/Subjective: denies any cp, sob, nausea, vomiting, fever or chills currenlty. No other sx at present. Review of Systems:     Comprehensive ROS negative except as mentioned above. Past Medical History:        Diagnosis Date    Cerebral artery occlusion with cerebral infarction ([address]     Diabetes mellitus ([address]     Heart attack ([address]     Hypertension        Past Surgical History:        Procedure Laterality Date    CARDIAC SURGERY      CHOLECYSTECTOMY      HERNIA REPAIR         Allergies:  Lovastatin    Past medical and surgical history reviewed. Any changes have been noted.      Scheduled and prn Medications:    Scheduled Meds:   insulin lispro  0-12 Units Subcutaneous 4x Daily AC & HS    aspirin  81 mg Oral Daily    metoprolol tartrate  25 mg Oral BID    therapeutic multivitamin-minerals  1 tablet Oral Daily    pantoprazole  40 mg Oral QAM AC    sodium chloride flush  10 mL Intravenous 2 times per day    enoxaparin  40 mg Subcutaneous Daily    insulin glargine  0.25 Units/kg Subcutaneous Daily     Continuous Infusions:   dextrose      lactated ringers 100 mL/hr at 02/19/20 0906    sodium chloride      dextrose 5 % and 0.45 % NaCl       PRN Meds:.glucose, dextrose, glucagon (rDNA), dextrose, dextrose, sodium chloride flush, magnesium hydroxide, ondansetron, acetaminophen, potassium chloride, magnesium sulfate, sodium phosphate IVPB **OR** sodium phosphate IVPB **OR** sodium phosphate IVPB, dextrose 5 % and 0.45 % NaCl    PHYSICAL EXAM:  /63   Pulse 80   Temp 98.3 °F (36.8 °C) (Oral)   Resp 20   Ht 6' (1.829 m)   Wt 206 lb 9.1 oz (93.7 kg)   SpO2 93%   BMI 28.02 kg/m²       Intake/Output Summary (Last 24 hours) at 2/19/2020 1509  Last data filed at 2/19/2020 1038  Gross per 24 hour   Intake 3587.1 ml   Output 1125 ml   Net 2462.1 ml       General: Alert and oriented. Resting in bed in no apparent distress. HEENT: Normocephalic. Atraumatic. Pupils equal and reactive. EOM intact. Oral mucosa pink/moist/intact. Neck: Supple. Symmetrical. Trachea midline. Lungs: Clear to auscultation bilaterally. Respirations even and unlabored. Chest: Exam unremarkable. Cardiac: S1/S2 noted. Regular Rhythm and rate. Abdomen/GI: Soft. Non-tender. Non-distended. BS+. Extremities: PP+. Atraumatic. No redness/cyanosis/edema noted. Brisk cap refill. Skin: Dry and intact. No lesions noted. Neuro: Grossly intact. No focal deficits noted. LABS:    Lab Results   Component Value Date    WBC 8.2 02/19/2020    HGB 12.5 (L) 02/19/2020    HCT 38.9 (L) 02/19/2020    MCV 81.3 02/19/2020     02/19/2020    LYMPHOPCT 3.8 02/19/2020    RBC 4.78 02/19/2020    MCH 26.1 02/19/2020    MCHC 32.1 02/19/2020    RDW 16.2 (H) 02/19/2020       Lab Results   Component Value Date    CREATININE 1.3 02/19/2020    BUN 18 02/19/2020     02/19/2020    K 4.2 02/19/2020     02/19/2020    CO2 17 (L) 02/19/2020       Lab Results   Component Value Date    MG 2.60 02/19/2020       Lab Results   Component Value Date    ALT 16 02/18/2020    AST 14 (L) 02/18/2020    ALKPHOS 73 02/18/2020    BILITOT 0.3 02/18/2020        No flowsheet data found.     Imaging:  CT ABDOMEN PELVIS WO CONTRAST   Final Result   No significant finding in the abdomen or pelvis. XR CHEST STANDARD (2 VW)   Final Result   No acute abnormality             Assessment & Plan:      DKA - mild  -Questionable with metabolic acidosis  -Patient had recurrent nausea vomiting and diarrhea which could also be contributing  -Blood sugars more stable now  -CO2 improving  -Transfer out of the unit  - cont achs accu checks with Med ss for now  - added home mediations  - Hg1Ac 8.8    Intractable nausea and vomiting with diarrhea  -Possibly related to AGE  -Had fever overnight of 101.  -Leukocytosis noted on admit but could be reactive and now resolved at 8.2  -CT abdomen pelvis with no acute findings    Metabolic acidosis  -Thought to be related to #1 and #2  -Continue supportive care as above    Essential hypertension  -Continue BB    CAD w/ PCI  - follows at the [place]      Body mass index is 28.02 kg/m². The patient and / or the family were informed of the results of any tests, a time was given to answer questions, a plan was proposed and they agreed with plan.     DVT prophylaxis: [x] Lovenox  [] SQ Heparin  [] SCDs because of  [] warfarin/oral direct thrombin inhibitor [] Encourage ambulation    GI prophylaxis: [x] PPI/[id number]  [] not indicated    Probiotic if on abx: [] Yes [] No [] Not Indicated    Diet: DIET CARB CONTROL;    Consults:  None    Disposition:  [] Home  [] Home with home health [] Rehab [] Psych [] SNF  [] LTAC  [] Long term nursing home or group home [] Transfer to ICU  [] Transfer to PCU [] Other:    Code Status: Full Code    ELOS: likely to transfer to KARIN Cosby CNP  02/19/20

## 2020-02-19 NOTE — PROGRESS NOTES
Pt to room 4251 via stretcher. Pt is alert and oriented x4 and ambulates independently. VSS and respirations are easy. Oriented pt to room and call light system. Wife is at the bedside. No other needs expressed at this time. Will continue to monitor.  Electronically signed by Roby Simmons RN on 2/19/2020 at 5:32 PM

## 2020-02-19 NOTE — PROGRESS NOTES
2155: Pt arrived to ICU Rm 2116 via stretcher on insulin gtt & fluids accompanied by ED Rn on monitor in stable. Walked to ICU bed. ICU monitors applied. Report received from ED RN, Pao Luo. Admission assessment completed. Patient alert and oriented x4. Lungs sounds are clear, diminished. Sinus tachycardia on the monitor. VSS. No edema noted. Wife at bedside. 2325: Lab at bedside for 0000 labs. 2345: Reassessment completed, no new changes. 0000: Messaged on call critical care, Dr. Dano Roche, via perfect serve to update on patient condition and arrival to unit. 7925: Potassium replacement started. 0125: Dr. Kaylah Archer, On call hospitalist at patient bedside. Burgess Manjeet menendez/deo DKA orders. See new orders per Dr. Burgess Burroughs. Told patient he is allowed to eat. Patient given sandwich and sugar free pudding from unit. 0130: Insulin gtt and D5 stopped. *Insulin titrated throughout shift per protocol*    0400: Reassessment completed, no new changes. 0413: Lab at bedside. 0600: Patient asleep in bed.     0620: BG checked this AM, 4 units of insulin given early per Dr. Burgess Burroughs d/deo gtt and PRN glucose checks. 0710: Report given to Gely Capellan RN. Patient resting in bed. Wife remains at bedside.      Electronically signed by Agnieszka Nuñez RN on 2/19/2020 at 7:38 AM

## 2020-02-20 VITALS
SYSTOLIC BLOOD PRESSURE: 162 MMHG | BODY MASS INDEX: 27.98 KG/M2 | HEART RATE: 97 BPM | OXYGEN SATURATION: 93 % | HEIGHT: 72 IN | RESPIRATION RATE: 16 BRPM | DIASTOLIC BLOOD PRESSURE: 91 MMHG | WEIGHT: 206.57 LBS | TEMPERATURE: 98.1 F

## 2020-02-20 LAB
ANION GAP SERPL CALCULATED.3IONS-SCNC: 12 MMOL/L (ref 3–16)
BUN BLDV-MCNC: 15 MG/DL (ref 7–20)
CALCIUM SERPL-MCNC: 8.7 MG/DL (ref 8.3–10.6)
CHLORIDE BLD-SCNC: 107 MMOL/L (ref 99–110)
CO2: 20 MMOL/L (ref 21–32)
CREAT SERPL-MCNC: 1.2 MG/DL (ref 0.8–1.3)
GFR AFRICAN AMERICAN: >60
GFR NON-AFRICAN AMERICAN: >60
GLUCOSE BLD-MCNC: 164 MG/DL (ref 70–99)
GLUCOSE BLD-MCNC: 191 MG/DL (ref 70–99)
MAGNESIUM: 2 MG/DL (ref 1.8–2.4)
METHANOL BLOOD: <5 MG/DL
PERFORMED ON: ABNORMAL
PHOSPHORUS: 2.3 MG/DL (ref 2.5–4.9)
POTASSIUM SERPL-SCNC: 3.8 MMOL/L (ref 3.5–5.1)
SODIUM BLD-SCNC: 139 MMOL/L (ref 136–145)

## 2020-02-20 PROCEDURE — 83735 ASSAY OF MAGNESIUM: CPT

## 2020-02-20 PROCEDURE — 6360000002 HC RX W HCPCS: Performed by: INTERNAL MEDICINE

## 2020-02-20 PROCEDURE — 94760 N-INVAS EAR/PLS OXIMETRY 1: CPT

## 2020-02-20 PROCEDURE — 36415 COLL VENOUS BLD VENIPUNCTURE: CPT

## 2020-02-20 PROCEDURE — 6370000000 HC RX 637 (ALT 250 FOR IP): Performed by: INTERNAL MEDICINE

## 2020-02-20 PROCEDURE — 6370000000 HC RX 637 (ALT 250 FOR IP): Performed by: NURSE PRACTITIONER

## 2020-02-20 PROCEDURE — 2580000003 HC RX 258: Performed by: INTERNAL MEDICINE

## 2020-02-20 PROCEDURE — 80048 BASIC METABOLIC PNL TOTAL CA: CPT

## 2020-02-20 PROCEDURE — 84100 ASSAY OF PHOSPHORUS: CPT

## 2020-02-20 RX ADMIN — ASPIRIN 81 MG 81 MG: 81 TABLET ORAL at 08:14

## 2020-02-20 RX ADMIN — SODIUM CHLORIDE, PRESERVATIVE FREE 10 ML: 5 INJECTION INTRAVENOUS at 08:21

## 2020-02-20 RX ADMIN — METOPROLOL TARTRATE 25 MG: 25 TABLET ORAL at 08:14

## 2020-02-20 RX ADMIN — INSULIN LISPRO 2 UNITS: 100 INJECTION, SOLUTION INTRAVENOUS; SUBCUTANEOUS at 08:13

## 2020-02-20 RX ADMIN — INSULIN GLARGINE 23 UNITS: 100 INJECTION, SOLUTION SUBCUTANEOUS at 08:13

## 2020-02-20 RX ADMIN — ENOXAPARIN SODIUM 40 MG: 40 INJECTION SUBCUTANEOUS at 08:14

## 2020-02-20 RX ADMIN — MULTIPLE VITAMINS W/ MINERALS TAB 1 TABLET: TAB at 08:14

## 2020-02-20 RX ADMIN — PANTOPRAZOLE SODIUM 40 MG: 40 TABLET, DELAYED RELEASE ORAL at 05:14

## 2020-02-20 ASSESSMENT — PAIN SCALES - GENERAL: PAINLEVEL_OUTOF10: 0

## 2020-02-20 NOTE — CARE COORDINATION
Problem: Pain, Acute (Adult)  Goal: Identify Related Risk Factors and Signs and Symptoms  Related risk factors and signs and symptoms are identified upon initiation of Human Response Clinical Practice Guideline (CPG).   Outcome: No Change  VSS. Headache managed with tylenol and ibuprofen. Neuros unchanged; BUE 4/5 BLE 2/5. Numbness in BLE that is baseline. Good po intake, pt is able to feed self. Inc of urine x 2, had BM on bedpan. Repositioned every 2 hours. Worked with PT and is currently up in . Pt will have one more dose of IV steroids Sunday. Unsure of DC plans at this time; home VS Rehab.        [facility]  Electronically signed by Sandy Zhu RN on 2/20/2020 at 10:37 AM

## 2020-02-20 NOTE — DISCHARGE SUMMARY
8 hours as needed for PainHistorical Med      Multiple Vitamins-Minerals (THERAPEUTIC MULTIVITAMIN-MINERALS) tablet Take 1 tablet by mouth dailyHistorical Med             The patient was seen and examined on day of discharge and this discharge summary is in conjunction with any daily progress note from day of discharge. Hospital Course: This is a 55-year-old male with a history of hypertension, diabetes and previous CVA presents to the ED with complaints of nausea vomiting and diarrhea. Was found to be acidotic with elevated blood sugar. Initially thought to be in DKA and started on insulin gtt. His gap quickly closed and insulin drip was stopped. He was started on Lantus here. Blood sugars normalized. A1c noted at 8.8. CO2 noted at 20 prior to discharge. Unsure of etiology of nausea vomiting and diarrhea. Could be related to AGE. CT abdomen unremarkable. He did spike a fever during his stay however had no further during his stay. Blood cultures are negative. Respiratory viral panel negative. We were unable to send stool sample as his stool was solid on his next bowel movement. All of his symptoms resolved and he was tolerating a diet prior to discharge. He did well on insulin however patient did not want to start this at home. I instructed him to follow-up with his PCP further regarding this as his A1c is more than it should be. He will resume metformin and glipizide at home. He was discharged home in stable condition to follow-up as an outpatient. He verbalized understanding and is in agreement with the plan of care. Exam:     BP (!) 162/91   Pulse 97   Temp 98.1 °F (36.7 °C) (Oral)   Resp 16   Ht 6' (1.829 m)   Wt 206 lb 9.1 oz (93.7 kg)   SpO2 93%   BMI 28.02 kg/m²     General: Resting in bed in no apparent distress. Very pleasant  HEENT: Normocephalic. Atraumatic. Pupils equal and reactive. EOM intact. Oral mucosa pink/moist/intact. Neck: Supple.  Symmetrical. Trachea midline. Lungs: Clear to auscultation bilaterally. Respirations even and unlabored. Chest: Exam unremarkable. Cardiac: S1/S2 noted. Regular Rhythm and rate. Abdomen/GI: Soft. Non-tender. Non-distended. BS+. Extremities: PP+. Atraumatic. No redness/cyanosis/edema noted. Brisk cap refill. Skin: Dry and intact. No lesions noted. Neuro: Grossly intact. No focal deficits noted. Significant Diagnostic Studies:   CT abd       Labs: For convenience and continuity at follow-up the following most recent labs are provided:    CBC:    Lab Results   Component Value Date    WBC 8.2 02/19/2020    HGB 12.5 02/19/2020    HCT 38.9 02/19/2020     02/19/2020       Renal:    Lab Results   Component Value Date     02/20/2020    K 3.8 02/20/2020    K 4.5 02/18/2020     02/20/2020    CO2 20 02/20/2020    BUN 15 02/20/2020    CREATININE 1.2 02/20/2020    CALCIUM 8.7 02/20/2020    PHOS 2.3 02/20/2020       No future appointments. Time Spent on discharge is more than 30 minutes in the examination, evaluation, counseling and review of medications and discharge plan. RX monitoring reviewed     Signed:    KARIN Conrad CNP   2/20/2020      Thank you No primary care provider on file. for the opportunity to be involved in this patient's care. If you have any questions or concerns please feel free to contact me at [phone].

## 2020-02-20 NOTE — PLAN OF CARE
Problem: Falls - Risk of:  Goal: Will remain free from falls  Description  Will remain free from falls  Outcome: Ongoing  Goal: Absence of physical injury  Description  Absence of physical injury  Outcome: Ongoing     Problem: Discharge Planning:  Goal: Discharged to appropriate level of care  Description  Discharged to appropriate level of care  Outcome: Ongoing     Problem: Fluid Volume - Imbalance:  Goal: Will remain free of signs and symptoms of dehydration  Description  Will remain free of signs and symptoms of dehydration  Outcome: Ongoing  Goal: Absence of imbalanced fluid volume signs and symptoms  Description  Absence of imbalanced fluid volume signs and symptoms  Outcome: Ongoing     Problem: Serum Glucose Level - Abnormal:  Goal: Ability to maintain appropriate glucose levels will improve  Description  Ability to maintain appropriate glucose levels will improve  Outcome: Ongoing     Problem: Injury - Acid Base Imbalance:  Goal: Acid-base balance  Description  Acid-base balance  Outcome: Ongoing

## 2020-02-20 NOTE — PLAN OF CARE
Problem: Falls - Risk of:  Goal: Will remain free from falls  Description  Will remain free from falls  2/20/2020 1045 by Emmanuel Callejas RN  Outcome: Completed  2/19/2020 2216 by Rickie Bee RN  Outcome: Ongoing  Goal: Absence of physical injury  Description  Absence of physical injury  2/20/2020 1045 by Emmanuel Callejas RN  Outcome: Completed  2/19/2020 2216 by Rickie Bee RN  Outcome: Ongoing     Problem: Discharge Planning:  Goal: Discharged to appropriate level of care  Description  Discharged to appropriate level of care  2/20/2020 1045 by Emmanuel Callejas RN  Outcome: Completed  2/19/2020 2216 by Rickie Bee RN  Outcome: Ongoing     Problem: Fluid Volume - Imbalance:  Goal: Will remain free of signs and symptoms of dehydration  Description  Will remain free of signs and symptoms of dehydration  2/20/2020 1045 by Emmanuel Callejas RN  Outcome: Completed  2/19/2020 2216 by Rickie Bee RN  Outcome: Ongoing  Goal: Absence of imbalanced fluid volume signs and symptoms  Description  Absence of imbalanced fluid volume signs and symptoms  2/20/2020 1045 by Emmanuel Callejas RN  Outcome: Completed  2/19/2020 2216 by Rickie Bee RN  Outcome: Ongoing     Problem: Serum Glucose Level - Abnormal:  Goal: Ability to maintain appropriate glucose levels will improve  Description  Ability to maintain appropriate glucose levels will improve  2/20/2020 1045 by Emmanuel Callejas RN  Outcome: Completed  2/19/2020 2216 by Rickie Bee RN  Outcome: Ongoing     Problem: Injury - Acid Base Imbalance:  Goal: Acid-base balance  Description  Acid-base balance  2/20/2020 1045 by Emmanuel Callejas RN  Outcome: Completed  2/19/2020 2216 by Rickie Bee RN  Outcome: Ongoing

## 2020-02-23 LAB — CULTURE, BLOOD 2: NORMAL

## 2021-02-03 ENCOUNTER — APPOINTMENT (OUTPATIENT)
Dept: GENERAL RADIOLOGY | Age: 71
DRG: 247 | End: 2021-02-03
Payer: OTHER GOVERNMENT

## 2021-02-03 ENCOUNTER — HOSPITAL ENCOUNTER (INPATIENT)
Age: 71
LOS: 2 days | Discharge: HOME | DRG: 247 | End: 2021-02-05
Attending: EMERGENCY MEDICINE | Admitting: INTERNAL MEDICINE
Payer: OTHER GOVERNMENT

## 2021-02-03 DIAGNOSIS — R07.9 CHEST PAIN, UNSPECIFIED TYPE: ICD-10-CM

## 2021-02-03 DIAGNOSIS — I20.0 UNSTABLE ANGINA (HCC): Primary | ICD-10-CM

## 2021-02-03 PROBLEM — I21.4 NSTEMI (NON-ST ELEVATED MYOCARDIAL INFARCTION) (HCC): Status: ACTIVE | Noted: 2021-02-03

## 2021-02-03 LAB
A/G RATIO: 1.5 (ref 1.1–2.2)
ALBUMIN SERPL-MCNC: 4.5 G/DL (ref 3.4–5)
ALP BLD-CCNC: 84 U/L (ref 40–129)
ALT SERPL-CCNC: 16 U/L (ref 10–40)
ANION GAP SERPL CALCULATED.3IONS-SCNC: 14 MMOL/L (ref 3–16)
APTT: 69.6 SEC (ref 24.2–36.2)
AST SERPL-CCNC: 16 U/L (ref 15–37)
BASOPHILS ABSOLUTE: 0 K/UL (ref 0–0.2)
BASOPHILS RELATIVE PERCENT: 0.4 %
BILIRUB SERPL-MCNC: 0.3 MG/DL (ref 0–1)
BUN BLDV-MCNC: 15 MG/DL (ref 7–20)
CALCIUM SERPL-MCNC: 9.4 MG/DL (ref 8.3–10.6)
CHLORIDE BLD-SCNC: 106 MMOL/L (ref 99–110)
CO2: 21 MMOL/L (ref 21–32)
CREAT SERPL-MCNC: 1.4 MG/DL (ref 0.8–1.3)
EKG ATRIAL RATE: 107 BPM
EKG ATRIAL RATE: 88 BPM
EKG DIAGNOSIS: NORMAL
EKG DIAGNOSIS: NORMAL
EKG P AXIS: 39 DEGREES
EKG P AXIS: 48 DEGREES
EKG P-R INTERVAL: 140 MS
EKG P-R INTERVAL: 150 MS
EKG Q-T INTERVAL: 336 MS
EKG Q-T INTERVAL: 356 MS
EKG QRS DURATION: 64 MS
EKG QRS DURATION: 82 MS
EKG QTC CALCULATION (BAZETT): 430 MS
EKG QTC CALCULATION (BAZETT): 448 MS
EKG R AXIS: -17 DEGREES
EKG R AXIS: -8 DEGREES
EKG T AXIS: 47 DEGREES
EKG T AXIS: 56 DEGREES
EKG VENTRICULAR RATE: 107 BPM
EKG VENTRICULAR RATE: 88 BPM
EOSINOPHILS ABSOLUTE: 0.4 K/UL (ref 0–0.6)
EOSINOPHILS RELATIVE PERCENT: 3.6 %
GFR AFRICAN AMERICAN: >60
GFR NON-AFRICAN AMERICAN: 50
GLOBULIN: 3.1 G/DL
GLUCOSE BLD-MCNC: 199 MG/DL (ref 70–99)
GLUCOSE BLD-MCNC: 292 MG/DL (ref 70–99)
HCT VFR BLD CALC: 40.3 % (ref 40.5–52.5)
HEMOGLOBIN: 12.6 G/DL (ref 13.5–17.5)
LYMPHOCYTES ABSOLUTE: 1.7 K/UL (ref 1–5.1)
LYMPHOCYTES RELATIVE PERCENT: 16 %
MCH RBC QN AUTO: 25.2 PG (ref 26–34)
MCHC RBC AUTO-ENTMCNC: 31.4 G/DL (ref 31–36)
MCV RBC AUTO: 80.3 FL (ref 80–100)
MONOCYTES ABSOLUTE: 0.6 K/UL (ref 0–1.3)
MONOCYTES RELATIVE PERCENT: 5.9 %
NEUTROPHILS ABSOLUTE: 7.8 K/UL (ref 1.7–7.7)
NEUTROPHILS RELATIVE PERCENT: 74.1 %
PDW BLD-RTO: 16.7 % (ref 12.4–15.4)
PERFORMED ON: ABNORMAL
PLATELET # BLD: 232 K/UL (ref 135–450)
PMV BLD AUTO: 9.4 FL (ref 5–10.5)
POTASSIUM REFLEX MAGNESIUM: 4.2 MMOL/L (ref 3.5–5.1)
PRO-BNP: 117 PG/ML (ref 0–124)
RBC # BLD: 5.02 M/UL (ref 4.2–5.9)
SODIUM BLD-SCNC: 141 MMOL/L (ref 136–145)
TOTAL PROTEIN: 7.6 G/DL (ref 6.4–8.2)
TROPONIN: 0.03 NG/ML
TROPONIN: 0.06 NG/ML
TROPONIN: 0.07 NG/ML
TROPONIN: <0.01 NG/ML
WBC # BLD: 10.5 K/UL (ref 4–11)

## 2021-02-03 PROCEDURE — 84484 ASSAY OF TROPONIN QUANT: CPT

## 2021-02-03 PROCEDURE — 71045 X-RAY EXAM CHEST 1 VIEW: CPT

## 2021-02-03 PROCEDURE — 99285 EMERGENCY DEPT VISIT HI MDM: CPT

## 2021-02-03 PROCEDURE — 36415 COLL VENOUS BLD VENIPUNCTURE: CPT

## 2021-02-03 PROCEDURE — 80053 COMPREHEN METABOLIC PANEL: CPT

## 2021-02-03 PROCEDURE — 96374 THER/PROPH/DIAG INJ IV PUSH: CPT

## 2021-02-03 PROCEDURE — 93005 ELECTROCARDIOGRAM TRACING: CPT | Performed by: EMERGENCY MEDICINE

## 2021-02-03 PROCEDURE — 2060000000 HC ICU INTERMEDIATE R&B

## 2021-02-03 PROCEDURE — 83036 HEMOGLOBIN GLYCOSYLATED A1C: CPT

## 2021-02-03 PROCEDURE — 83880 ASSAY OF NATRIURETIC PEPTIDE: CPT

## 2021-02-03 PROCEDURE — 85025 COMPLETE CBC W/AUTO DIFF WBC: CPT

## 2021-02-03 PROCEDURE — 2500000003 HC RX 250 WO HCPCS: Performed by: EMERGENCY MEDICINE

## 2021-02-03 PROCEDURE — 6370000000 HC RX 637 (ALT 250 FOR IP): Performed by: EMERGENCY MEDICINE

## 2021-02-03 PROCEDURE — 93010 ELECTROCARDIOGRAM REPORT: CPT | Performed by: INTERNAL MEDICINE

## 2021-02-03 PROCEDURE — 6360000002 HC RX W HCPCS: Performed by: EMERGENCY MEDICINE

## 2021-02-03 PROCEDURE — 85730 THROMBOPLASTIN TIME PARTIAL: CPT

## 2021-02-03 PROCEDURE — 6370000000 HC RX 637 (ALT 250 FOR IP): Performed by: INTERNAL MEDICINE

## 2021-02-03 RX ORDER — EZETIMIBE 10 MG/1
10 TABLET ORAL DAILY
Status: DISCONTINUED | OUTPATIENT
Start: 2021-02-04 | End: 2021-02-05 | Stop reason: HOSPADM

## 2021-02-03 RX ORDER — DEXTROSE MONOHYDRATE 25 G/50ML
12.5 INJECTION, SOLUTION INTRAVENOUS PRN
Status: DISCONTINUED | OUTPATIENT
Start: 2021-02-03 | End: 2021-02-05 | Stop reason: HOSPADM

## 2021-02-03 RX ORDER — ACETAMINOPHEN 650 MG/1
650 SUPPOSITORY RECTAL EVERY 6 HOURS PRN
Status: DISCONTINUED | OUTPATIENT
Start: 2021-02-03 | End: 2021-02-05 | Stop reason: HOSPADM

## 2021-02-03 RX ORDER — HEPARIN SODIUM 1000 [USP'U]/ML
4000 INJECTION, SOLUTION INTRAVENOUS; SUBCUTANEOUS ONCE
Status: COMPLETED | OUTPATIENT
Start: 2021-02-03 | End: 2021-02-03

## 2021-02-03 RX ORDER — EZETIMIBE 10 MG/1
10 TABLET ORAL DAILY
COMMUNITY
Start: 2020-12-14

## 2021-02-03 RX ORDER — ASPIRIN 81 MG/1
324 TABLET, CHEWABLE ORAL ONCE
Status: DISCONTINUED | OUTPATIENT
Start: 2021-02-03 | End: 2021-02-03

## 2021-02-03 RX ORDER — NITROGLYCERIN 0.4 MG/1
0.4 TABLET SUBLINGUAL EVERY 5 MIN PRN
Status: DISCONTINUED | OUTPATIENT
Start: 2021-02-03 | End: 2021-02-05 | Stop reason: HOSPADM

## 2021-02-03 RX ORDER — HEPARIN SODIUM 1000 [USP'U]/ML
60 INJECTION, SOLUTION INTRAVENOUS; SUBCUTANEOUS ONCE
Status: DISCONTINUED | OUTPATIENT
Start: 2021-02-03 | End: 2021-02-03 | Stop reason: DRUGHIGH

## 2021-02-03 RX ORDER — M-VIT,TX,IRON,MINS/CALC/FOLIC 27MG-0.4MG
1 TABLET ORAL DAILY
Status: DISCONTINUED | OUTPATIENT
Start: 2021-02-04 | End: 2021-02-05 | Stop reason: HOSPADM

## 2021-02-03 RX ORDER — ASPIRIN 81 MG/1
81 TABLET, CHEWABLE ORAL NIGHTLY
Status: DISCONTINUED | OUTPATIENT
Start: 2021-02-03 | End: 2021-02-05 | Stop reason: HOSPADM

## 2021-02-03 RX ORDER — EMPAGLIFLOZIN 25 MG/1
25 TABLET, FILM COATED ORAL DAILY
COMMUNITY
Start: 2020-12-14

## 2021-02-03 RX ORDER — PANTOPRAZOLE SODIUM 40 MG/1
40 TABLET, DELAYED RELEASE ORAL
Status: DISCONTINUED | OUTPATIENT
Start: 2021-02-04 | End: 2021-02-05 | Stop reason: HOSPADM

## 2021-02-03 RX ORDER — AMLODIPINE BESYLATE 5 MG/1
5 TABLET ORAL DAILY
Status: DISCONTINUED | OUTPATIENT
Start: 2021-02-04 | End: 2021-02-05 | Stop reason: HOSPADM

## 2021-02-03 RX ORDER — ONDANSETRON 2 MG/ML
8 INJECTION INTRAMUSCULAR; INTRAVENOUS ONCE
Status: COMPLETED | OUTPATIENT
Start: 2021-02-03 | End: 2021-02-03

## 2021-02-03 RX ORDER — MORPHINE SULFATE 4 MG/ML
4 INJECTION, SOLUTION INTRAMUSCULAR; INTRAVENOUS ONCE
Status: COMPLETED | OUTPATIENT
Start: 2021-02-03 | End: 2021-02-03

## 2021-02-03 RX ORDER — HEPARIN SODIUM 1000 [USP'U]/ML
60 INJECTION, SOLUTION INTRAVENOUS; SUBCUTANEOUS PRN
Status: DISCONTINUED | OUTPATIENT
Start: 2021-02-03 | End: 2021-02-03

## 2021-02-03 RX ORDER — SODIUM CHLORIDE 0.9 % (FLUSH) 0.9 %
10 SYRINGE (ML) INJECTION PRN
Status: DISCONTINUED | OUTPATIENT
Start: 2021-02-03 | End: 2021-02-05 | Stop reason: HOSPADM

## 2021-02-03 RX ORDER — SODIUM CHLORIDE 0.9 % (FLUSH) 0.9 %
10 SYRINGE (ML) INJECTION EVERY 12 HOURS SCHEDULED
Status: DISCONTINUED | OUTPATIENT
Start: 2021-02-03 | End: 2021-02-05 | Stop reason: HOSPADM

## 2021-02-03 RX ORDER — ONDANSETRON 2 MG/ML
4 INJECTION INTRAMUSCULAR; INTRAVENOUS EVERY 6 HOURS PRN
Status: DISCONTINUED | OUTPATIENT
Start: 2021-02-03 | End: 2021-02-05 | Stop reason: HOSPADM

## 2021-02-03 RX ORDER — POLYETHYLENE GLYCOL 3350 17 G/17G
17 POWDER, FOR SOLUTION ORAL DAILY PRN
Status: DISCONTINUED | OUTPATIENT
Start: 2021-02-03 | End: 2021-02-05 | Stop reason: HOSPADM

## 2021-02-03 RX ORDER — DEXTROSE MONOHYDRATE 50 MG/ML
100 INJECTION, SOLUTION INTRAVENOUS PRN
Status: DISCONTINUED | OUTPATIENT
Start: 2021-02-03 | End: 2021-02-05 | Stop reason: HOSPADM

## 2021-02-03 RX ORDER — HEPARIN SODIUM 10000 [USP'U]/100ML
10 INJECTION, SOLUTION INTRAVENOUS CONTINUOUS
Status: DISCONTINUED | OUTPATIENT
Start: 2021-02-03 | End: 2021-02-04

## 2021-02-03 RX ORDER — PROMETHAZINE HYDROCHLORIDE 25 MG/1
12.5 TABLET ORAL EVERY 6 HOURS PRN
Status: DISCONTINUED | OUTPATIENT
Start: 2021-02-03 | End: 2021-02-05 | Stop reason: HOSPADM

## 2021-02-03 RX ORDER — ACETAMINOPHEN 325 MG/1
650 TABLET ORAL EVERY 6 HOURS PRN
Status: DISCONTINUED | OUTPATIENT
Start: 2021-02-03 | End: 2021-02-04 | Stop reason: ALTCHOICE

## 2021-02-03 RX ORDER — HEPARIN SODIUM 1000 [USP'U]/ML
30 INJECTION, SOLUTION INTRAVENOUS; SUBCUTANEOUS PRN
Status: DISCONTINUED | OUTPATIENT
Start: 2021-02-03 | End: 2021-02-03

## 2021-02-03 RX ORDER — NICOTINE POLACRILEX 4 MG
15 LOZENGE BUCCAL PRN
Status: DISCONTINUED | OUTPATIENT
Start: 2021-02-03 | End: 2021-02-05 | Stop reason: HOSPADM

## 2021-02-03 RX ORDER — ATORVASTATIN CALCIUM 80 MG/1
80 TABLET, FILM COATED ORAL NIGHTLY
Status: DISCONTINUED | OUTPATIENT
Start: 2021-02-03 | End: 2021-02-05 | Stop reason: HOSPADM

## 2021-02-03 RX ADMIN — NITROGLYCERIN 0.5 INCH: 20 OINTMENT TOPICAL at 23:54

## 2021-02-03 RX ADMIN — NITROGLYCERIN 0.4 MG: 0.4 TABLET SUBLINGUAL at 12:18

## 2021-02-03 RX ADMIN — METOPROLOL TARTRATE 25 MG: 25 TABLET, FILM COATED ORAL at 20:25

## 2021-02-03 RX ADMIN — NITROGLYCERIN 0.4 MG: 0.4 TABLET SUBLINGUAL at 12:23

## 2021-02-03 RX ADMIN — NITROGLYCERIN 0.5 INCH: 20 OINTMENT TOPICAL at 20:25

## 2021-02-03 RX ADMIN — ASPIRIN 81 MG: 81 TABLET, CHEWABLE ORAL at 20:25

## 2021-02-03 RX ADMIN — ATORVASTATIN CALCIUM 80 MG: 80 TABLET, FILM COATED ORAL at 20:25

## 2021-02-03 RX ADMIN — HEPARIN SODIUM 4000 UNITS: 1000 INJECTION INTRAVENOUS; SUBCUTANEOUS at 16:39

## 2021-02-03 RX ADMIN — HEPARIN SODIUM 10 ML/HR: 10000 INJECTION, SOLUTION INTRAVENOUS at 16:39

## 2021-02-03 RX ADMIN — MORPHINE SULFATE 4 MG: 4 INJECTION INTRAVENOUS at 12:35

## 2021-02-03 RX ADMIN — ONDANSETRON 8 MG: 2 INJECTION INTRAMUSCULAR; INTRAVENOUS at 12:35

## 2021-02-03 RX ADMIN — INSULIN LISPRO 2 UNITS: 100 INJECTION, SOLUTION INTRAVENOUS; SUBCUTANEOUS at 20:54

## 2021-02-03 ASSESSMENT — PAIN DESCRIPTION - ORIENTATION
ORIENTATION: LEFT
ORIENTATION: LEFT

## 2021-02-03 ASSESSMENT — PAIN DESCRIPTION - LOCATION
LOCATION: CHEST
LOCATION: CHEST

## 2021-02-03 ASSESSMENT — PAIN SCALES - GENERAL: PAINLEVEL_OUTOF10: 3

## 2021-02-03 ASSESSMENT — PAIN DESCRIPTION - PAIN TYPE: TYPE: ACUTE PAIN

## 2021-02-03 ASSESSMENT — PAIN DESCRIPTION - PROGRESSION: CLINICAL_PROGRESSION: NOT CHANGED

## 2021-02-03 ASSESSMENT — PAIN DESCRIPTION - FREQUENCY: FREQUENCY: CONTINUOUS

## 2021-02-03 ASSESSMENT — PAIN DESCRIPTION - DESCRIPTORS: DESCRIPTORS: CONSTANT

## 2021-02-03 ASSESSMENT — PAIN - FUNCTIONAL ASSESSMENT: PAIN_FUNCTIONAL_ASSESSMENT: ACTIVITIES ARE NOT PREVENTED

## 2021-02-03 NOTE — PROGRESS NOTES
Clinical Pharmacy Note  Heparin Dosing Consult    Karen Echols is a 79 y.o. male ordered heparin per low dose nomogram by Dr. Marissa Solis. Lab Results   Component Value Date    APTT 29.6 02/19/2020     Lab Results   Component Value Date    HGB 12.6 02/03/2021    HCT 40.3 02/03/2021     02/03/2021    INR 1.10 02/19/2020       Ht Readings from Last 1 Encounters:   02/18/20 6' (1.829 m)        Wt Readings from Last 1 Encounters:   02/03/21 203 lb 11.3 oz (92.4 kg)     Dosing weight: 92 kg    Assessment/Plan:  Initial bolus: 4000 units  Initial infusion rate: 10 mL/hr  Next aPTT: 2/3/21 2300    Pharmacy will continue to monitor adjust heparin based on aPTT results using nomogram below:     LOW DOSE HEPARIN PROTOCOL (ACS/STEMI/A FIB)     Initial Bolus: 60 units/kg Max Bolus: 4,000 units       Initial Rate: 12 units/kg/hr Max Initial Rate: 1,000 units/hr     aPTT < 36   Heparin 60 units/kg bolus Increase infusion by 4 units/kg/hr       (maximum 4,000 units)   aPTT 37-48   Heparin 30 units/kg bolus Increase infusion by 2 units/kg/hr       (maximum 2,000 units)   aPTT 49-76   No bolus   No change   aPTT 77-85   No bolus   Decrease infusion by 2 units/kg/hr   aPTT 86-94   Hold heparin for 1 hour Decrease infusion by 3 units/kg/hr   aPTT     Hold heparin for 1 hour Decrease infusion by 4 units/kg/hr   aPTT > 103   Hold heparin for 1 hour Decrease infusion by 6 units/kg/hr    Obtain aPTT 6 hours after initial bolus and 6 hours after any dose change until two consecutive therapeutic aPTTs are achieved - then daily.   Verenice Gordon, [facility],2/3/2021,6:08 PM

## 2021-02-03 NOTE — PLAN OF CARE
Problem: SAFETY  Goal: Free from accidental physical injury  Outcome: Ongoing  Goal: Free from intentional harm  Outcome: Ongoing     Problem: DAILY CARE  Goal: Daily care needs are met  Outcome: Ongoing     Problem: PAIN  Goal: Patient's pain/discomfort is manageable  Outcome: Ongoing     Problem: SKIN INTEGRITY  Goal: Skin integrity is maintained or improved  Outcome: Ongoing     Problem: KNOWLEDGE DEFICIT  Goal: Patient/S.O. demonstrates understanding of disease process, treatment plan, medications, and discharge instructions.   Outcome: Ongoing

## 2021-02-03 NOTE — ED NOTES
Report called to    [facility]   To Room 4070  Cardiac monitor on during transfer  Pt's pain level   Denies    VSS, Afebrile   IV site is clean, dry and intact,  Heparin gtt at 10 ml/hr   Family updated on transfer         Savannah Davalos RN  02/03/21 6849

## 2021-02-03 NOTE — ED NOTES
Pt resting in bed at this time. Call light remains in reach instructed pt how to use, and encouraged pt to call if needed assistance, no distress noted. RR even and unlabored, skin warm and dry. No needs at this time. Will continue to monitor closely.          Jefferson Hunt RN  02/03/21 0745

## 2021-02-03 NOTE — ED NOTES
Pt arrived to the ED via [facility], pt states that he was at a store and had sudden chest pain, his wife drove him to the fire department so they could bring him here, pt is alert and orient, pt had 324 of asprin in route, pt states that pain is a 7/10 on pain scale, vss afebrile      Karen Tanner, PARAG  02/03/21 3763

## 2021-02-03 NOTE — H&P
Hospital Medicine History & Physical      PCP: No primary care provider on file. Date of Admission: 2/3/2021    Date of Service: Pt seen/examined on 02/03/21 and Admitted to Inpatient with expected LOS greater than two midnights due to medical therapy. Chief Complaint: Chest pain and pressure      History Of Present Illness:     79 y.o. male Carlos Melo retired [employer] with history of CAD (stents in place in 2007 at [facility]), type 2 diabetes non-insulin-dependent  -Presents to ED with chest pain and pressure  Last week he was at [place] and felt some chest pressure which went away on its own. He was again at [place] today after walking around for half an hour while he was checking out he started having chest pressure, he continued to drive home but chest pressure and pain got worse, rates it as 12 out of 10. He stopped driving he asked his wife to drive from thereon who brought him to the ED for further evaluation. At the time of the ED evaluation his pressure was 7 out of 10. He says this brings/back from 2007 when he had the first heart attack. He denies shortness of breath or coughing. He does say that he broke out in a sweat at that time. EKG showed normal sinus rhythm with frequent PVCs low voltage QRS complexes. Arrival to the hospital heart rate 99, blood pressure 106/68, 97% on room air. Labs showed initial troponin less than 0.01 follow-up 0.06. On my evaluation he has Nitropaste on board, he says his pressures to 4 out of 10  He was started on heparin drip and admitted to the hospital further evaluation for NSTEMI.     Past Medical History:          Diagnosis Date    Cerebral artery occlusion with cerebral infarction ([facility] [address]     Diabetes mellitus ([facility] [address]     Heart attack ([facility] [address]     Hypertension        Past Surgical History:          Procedure Laterality Date    CARDIAC SURGERY      CHOLECYSTECTOMY      HERNIA REPAIR         Medications Prior to Admission:      Prior to Admission medications    Medication Sig Start Date End Date Taking? Authorizing Provider   ezetimibe (ZETIA) 10 MG tablet Take 10 mg by mouth daily 12/14/20  Yes Historical Provider, MD   JARDIANCE 25 MG tablet Take 12.5 mg by mouth daily 12/14/20  Yes Historical Provider, MD   metoprolol tartrate (LOPRESSOR) 25 MG tablet Take 25 mg by mouth 2 times daily   Yes Historical Provider, MD   amLODIPine (NORVASC) 10 MG tablet Take 5 mg by mouth daily   Yes Historical Provider, MD   atorvastatin (LIPITOR) 80 MG tablet Take 80 mg by mouth nightly    Yes Historical Provider, MD   omeprazole (PRILOSEC) 20 MG delayed release capsule Take 20 mg by mouth Daily   Yes Historical Provider, MD   metFORMIN (GLUCOPHAGE) 1000 MG tablet Take 1,000 mg by mouth 2 times daily (with meals)   Yes Historical Provider, MD   aspirin 81 MG tablet Take 81 mg by mouth nightly    Yes Historical Provider, MD   losartan (COZAAR) 50 MG tablet Take 50 mg by mouth daily   Yes Historical Provider, MD   glipiZIDE (GLUCOTROL) 10 MG tablet Take 10 mg by mouth 2 times daily (before meals)   Yes Historical Provider, MD   acetaminophen (TYLENOL) 650 MG extended release tablet Take 650 mg by mouth every 8 hours as needed for Pain   Yes Historical Provider, MD   Multiple Vitamins-Minerals (THERAPEUTIC MULTIVITAMIN-MINERALS) tablet Take 1 tablet by mouth daily   Yes Historical Provider, MD       Allergies:  Lovastatin    Social History:      The patient currently lives at home with his wife    TOBACCO:   reports that he has never smoked. He has never used smokeless tobacco.  ETOH:   reports current alcohol use. Family History:      Reviewed    History reviewed. No pertinent family history. REVIEW OF SYSTEMS:   Pertinent positives as noted in the HPI. All other systems reviewed and negative.     PHYSICAL EXAM PERFORMED:    /64   Pulse 87   Temp 98.3 °F (36.8 °C) (Oral)   Resp 16   Wt 203 lb 11.3 oz (92.4 kg)   SpO2 95%   BMI 27.63 kg/m² General appearance: Mild to moderate distress, appears stated age and cooperative. HEENT:  Normal cephalic, atraumatic without obvious deformity. Pupils equal, round, and reactive to light. Extra ocular muscles intact. Conjunctivae/corneas clear. Neck: Supple, with full range of motion. No jugular venous distention. Trachea midline. Respiratory:  Normal respiratory effort. Clear to auscultation, bilaterally without Rales/Wheezes/Rhonchi. Cardiovascular:  Regular rate and rhythm with normal S1/S2 without murmurs, rubs or gallops. Abdomen: Soft, non-tender, non-distended with normal bowel sounds. Musculoskeletal:  No clubbing, cyanosis or edema bilaterally. Full range of motion without deformity. Skin: Skin color, texture, turgor normal.  No rashes or lesions. Neurologic:  Neurovascularly intact without any focal sensory/motor deficits. Cranial nerves: II-XII intact, grossly non-focal.  Psychiatric:  Alert and oriented, thought content appropriate, normal insight  Capillary Refill: Brisk,< 3 seconds   Peripheral Pulses: +2 palpable, equal bilaterally       Labs:     Recent Labs     02/03/21  1202   WBC 10.5   HGB 12.6*   HCT 40.3*        Recent Labs     02/03/21  1202      K 4.2      CO2 21   BUN 15   CREATININE 1.4*   CALCIUM 9.4     Recent Labs     02/03/21  1202   AST 16   ALT 16   BILITOT 0.3   ALKPHOS 84     No results for input(s): INR in the last 72 hours.   Recent Labs     02/03/21  1202 02/03/21  1507 02/03/21  1849   TROPONINI <0.01 0.03* 0.06*       Urinalysis:      Lab Results   Component Value Date    NITRU Negative 02/18/2020    WBCUA 0 02/18/2020    RBCUA 1 02/18/2020    BLOODU Negative 02/18/2020    SPECGRAV >1.030 02/18/2020    GLUCOSEU >=1000 02/18/2020       Radiology:     CXR: I have reviewed the CXR with the following interpretation: No  consolidation effusion or pneumothorax  EKG:  I have reviewed the EKG with the following interpretation:  Normal sinus rhythm with frequent PVCs and low voltage QRS with age-indeterminate septal infarct    XR CHEST PORTABLE   Final Result   No active cardiopulmonary disease             ASSESSMENT/PLAN:    Active Hospital Problems    Diagnosis Date Noted    NSTEMI (non-ST elevated myocardial infarction) ([facility] [address] [I21.4] 02/03/2021     NSTEMI, chest pain mid sternal, EKG normal sinus rhythm with PVCs, initial troponin negative but follow-up 0.06  Pain improved with nitro paste and aspirin  Continue Nitropaste 0.6 inch every 6 hours  Heparin drip started per ACS protocol  Continue aspirin, high intensity statin  Cardiology consulted, will benefit from left heart cath in the morning  Placed on clear liquid diet with no caffeine  N.p.o. after midnight    Hypertension  Continue home metoprolol 25 mg twice daily  Continue home Norvasc 5 mg once daily    Type 2 diabetes  Hold home blood pressure medication  Continue low-dose sliding-scale insulin  Hypoglycemia protocol    Hyperlipidemia  Continue home statin high intensity 80 mg nightly  Continue Zetia 10 mg once daily    DVT Prophylaxis: Lovenox  Diet: Diet NPO, After Midnight  DIET CLEAR LIQUID; No Caffeine  Code Status: Full Code    PT/OT Eval Status: N/A    Dispo - Admit as inpatient. I anticipate hospitalization spanning more than two midnights for investigation and treatment of the above medically necessary diagnoses. Nicky Menjivar MD   Hospitalist    Thank you No primary care provider on file. for the opportunity to be involved in this patient's care. If you have any questions or concerns please feel free to contact me at [phone].

## 2021-02-03 NOTE — ED PROVIDER NOTES
JARDIANCE 25 MG tablet Take 12.5 mg by mouth daily      metoprolol tartrate (LOPRESSOR) 25 MG tablet Take 25 mg by mouth 2 times daily      amLODIPine (NORVASC) 10 MG tablet Take 5 mg by mouth daily      atorvastatin (LIPITOR) 80 MG tablet Take 80 mg by mouth nightly       omeprazole (PRILOSEC) 20 MG delayed release capsule Take 20 mg by mouth Daily      metFORMIN (GLUCOPHAGE) 1000 MG tablet Take 1,000 mg by mouth 2 times daily (with meals)      aspirin 81 MG tablet Take 81 mg by mouth nightly       losartan (COZAAR) 50 MG tablet Take 50 mg by mouth daily      glipiZIDE (GLUCOTROL) 10 MG tablet Take 10 mg by mouth 2 times daily (before meals)      acetaminophen (TYLENOL) 650 MG extended release tablet Take 650 mg by mouth every 8 hours as needed for Pain      Multiple Vitamins-Minerals (THERAPEUTIC MULTIVITAMIN-MINERALS) tablet Take 1 tablet by mouth daily         ALLERGIES  Allergies   Allergen Reactions    Lovastatin      Confusion         WELLS Criteria  ? PHYSICAL EXAM  VITAL SIGNS: /68   Pulse 99   Temp 98.5 °F (36.9 °C) (Oral)   Resp 17   Wt 203 lb 11.3 oz (92.4 kg)   SpO2 97%   BMI 27.63 kg/m²   Constitutional: Well-developed, well-nourished, appears normal, nontoxic, activity: Resting comfortably on the cart, no obvious pain, is not diaphoretic at this time. He is speaking full sentences. HENT: Normocephalic, Atraumatic, Bilateral ears are normal, Oropharynx moist, No oral exudates, Nose normal.  Eyes: PERRLA, EOMI, Conjunctiva normal, No discharge. No scleral icterus. Neck: Normal range of motion, No tenderness, Supple,  Lymphatic: No lymphadenopathy noted.   Cardiovascular: Mildly tachycardic heart rate, normal rhythm, no murmurs, no clicks, no rubs, no gallops  Thorax & Lungs: normal breath sounds, no respiratory distress, no wheezing, no rales, no rhonchi  Abdomen: Soft, no tender with no distension, no masses, no pulsatile masses, no hepatosplenomegaly, normal bowel sounds  Skin: Warm, Dry, No erythema, No rash. Back: No tenderness, Full range of motion, No scoliosis. Extremities: No edema, No tenderness, No cyanosis, No clubbing. No amputations, capillary refill less than 2 seconds. Musculoskeletal: Good range of motion in all major joints, No tenderness to palpation or major deformities noted.   Neurologic: Alert & oriented x 3  Psychiatric: Affect normal, Mood normal.      LABORATORY  Labs Reviewed   COMPREHENSIVE METABOLIC PANEL W/ REFLEX TO MG FOR LOW K - Abnormal; Notable for the following components:       Result Value    Glucose 199 (*)     CREATININE 1.4 (*)     GFR Non- 50 (*)     All other components within normal limits    Narrative:     Performed at:  [facility]  [address]   Phone [phone]   CBC WITH AUTO DIFFERENTIAL - Abnormal; Notable for the following components:    Hemoglobin 12.6 (*)     Hematocrit 40.3 (*)     MCH 25.2 (*)     RDW 16.7 (*)     Neutrophils Absolute 7.8 (*)     All other components within normal limits    Narrative:     Performed at:  [facility]  [address] [facility] [address]   Phone [phone]   TROPONIN    Narrative:     Performed at:  [facility]  [address]   Phone [phone]   BRAIN NATRIURETIC PEPTIDE   TROPONIN       ?  EKG  EKG Interpretation    Interpreted by emergency department physician  Time performed: 1150  Time read: 1153    Rhythm: Sinus tachycardia  Ventricular Rate: 107  QRS Axis: -17  Ectopy: Frequent PVCs  Conduction: Normal sinus rhythm with frequent PVCs and low voltage QRS with age-indeterminate septal infarct  ST Segments: normal  T Waves: normal  Q Waves: Lead III only with poor R wave progression in the septal leads    Other findings: None    Compared to EKG on: 2/18/2020 appears unchanged except for frequent Cirilo at 1600 hrs. pending repeat troponin. The hospitalist was notified of the impending admission and the pending troponin. The patient's blood pressure was found to be elevated according to CMS/Medicare and the Affordable Care Act/ObamaCare criteria. Elevated blood pressure could occur because of pain or anxiety or other reasons and does not mean that they need to have their blood pressure treated or medications otherwise adjusted. However, this could also be a sign that they will need to have their blood pressure treated or medications changed. The patient was instructed to follow up closely with their personal physician to have their blood pressure rechecked. The patient was instructed to take a list of recent blood pressure readings to their next visit with their personal physician. I reviewed old records     (This chart has been completed using [address]. Although attempts have been made to ensure accuracy, words and/or phrases may not be transcribed as intended.)    Patient refused pain medicines at the time of their exam.    IMPRESSION(S):  1. Unstable angina (HCC)    2. Chest pain, unspecified type        ? Recheck Times: 7560, 7092  Critical Care Time: 35 minutes not including billable procedures    Diagnostic considerations include but are not limited to:  myocardial infarction, pulmonary embolus, pneumothorax, pneumonia, aortic dissection, empyema, musculoskeletal chest pain, pulmonary contusion, pericardial effusion, pericarditis, and referred abdominal pain.          Min Salazar DO  02/03/21 1553

## 2021-02-03 NOTE — ED NOTES
Pt resting in bed at this time. Call light remains in reach instructed pt how to use, and encouraged pt to call if needed assistance, no distress noted. RR even and unlabored, skin warm and dry. No needs at this time. Will continue to monitor closely.          Nicole Lebron RN  02/03/21 9017

## 2021-02-03 NOTE — ED NOTES
Pt resting in bed at this time. Call light remains in reach instructed pt how to use, and encouraged pt to call if needed assistance, no distress noted. RR even and unlabored, skin warm and dry. No needs at this time. Will continue to monitor closely.     Pt was provided a turkey sandwhich and agustín Morales RN  02/03/21 8406

## 2021-02-04 ENCOUNTER — APPOINTMENT (OUTPATIENT)
Dept: CARDIAC CATH/INVASIVE PROCEDURES | Age: 71
DRG: 247 | End: 2021-02-04
Payer: OTHER GOVERNMENT

## 2021-02-04 LAB
APTT: 61 SEC (ref 24.2–36.2)
EKG ATRIAL RATE: 79 BPM
EKG DIAGNOSIS: NORMAL
EKG P AXIS: 14 DEGREES
EKG P-R INTERVAL: 140 MS
EKG Q-T INTERVAL: 348 MS
EKG QRS DURATION: 70 MS
EKG QTC CALCULATION (BAZETT): 399 MS
EKG R AXIS: -11 DEGREES
EKG T AXIS: 47 DEGREES
EKG VENTRICULAR RATE: 79 BPM
ESTIMATED AVERAGE GLUCOSE: 188.6 MG/DL
GLUCOSE BLD-MCNC: 134 MG/DL (ref 70–99)
GLUCOSE BLD-MCNC: 135 MG/DL (ref 70–99)
GLUCOSE BLD-MCNC: 247 MG/DL (ref 70–99)
GLUCOSE BLD-MCNC: 266 MG/DL (ref 70–99)
HBA1C MFR BLD: 8.2 %
HCT VFR BLD CALC: 35.9 % (ref 40.5–52.5)
HEMOGLOBIN: 11.3 G/DL (ref 13.5–17.5)
MCH RBC QN AUTO: 25.2 PG (ref 26–34)
MCHC RBC AUTO-ENTMCNC: 31.5 G/DL (ref 31–36)
MCV RBC AUTO: 79.9 FL (ref 80–100)
PDW BLD-RTO: 16.4 % (ref 12.4–15.4)
PERFORMED ON: ABNORMAL
PLATELET # BLD: 185 K/UL (ref 135–450)
PMV BLD AUTO: 9.3 FL (ref 5–10.5)
POC ACT LR: 242 SEC
RBC # BLD: 4.49 M/UL (ref 4.2–5.9)
WBC # BLD: 8.5 K/UL (ref 4–11)

## 2021-02-04 PROCEDURE — C1887 CATHETER, GUIDING: HCPCS

## 2021-02-04 PROCEDURE — C1725 CATH, TRANSLUMIN NON-LASER: HCPCS

## 2021-02-04 PROCEDURE — 6370000000 HC RX 637 (ALT 250 FOR IP)

## 2021-02-04 PROCEDURE — 2720000010 HC SURG SUPPLY STERILE

## 2021-02-04 PROCEDURE — 93005 ELECTROCARDIOGRAM TRACING: CPT | Performed by: INTERNAL MEDICINE

## 2021-02-04 PROCEDURE — B2111ZZ FLUOROSCOPY OF MULTIPLE CORONARY ARTERIES USING LOW OSMOLAR CONTRAST: ICD-10-PCS | Performed by: INTERNAL MEDICINE

## 2021-02-04 PROCEDURE — 4A023N7 MEASUREMENT OF CARDIAC SAMPLING AND PRESSURE, LEFT HEART, PERCUTANEOUS APPROACH: ICD-10-PCS | Performed by: INTERNAL MEDICINE

## 2021-02-04 PROCEDURE — C1874 STENT, COATED/COV W/DEL SYS: HCPCS

## 2021-02-04 PROCEDURE — 2060000000 HC ICU INTERMEDIATE R&B

## 2021-02-04 PROCEDURE — 85730 THROMBOPLASTIN TIME PARTIAL: CPT

## 2021-02-04 PROCEDURE — 93010 ELECTROCARDIOGRAM REPORT: CPT | Performed by: INTERNAL MEDICINE

## 2021-02-04 PROCEDURE — 36415 COLL VENOUS BLD VENIPUNCTURE: CPT

## 2021-02-04 PROCEDURE — 99152 MOD SED SAME PHYS/QHP 5/>YRS: CPT

## 2021-02-04 PROCEDURE — 6370000000 HC RX 637 (ALT 250 FOR IP): Performed by: INTERNAL MEDICINE

## 2021-02-04 PROCEDURE — 85347 COAGULATION TIME ACTIVATED: CPT

## 2021-02-04 PROCEDURE — 99153 MOD SED SAME PHYS/QHP EA: CPT

## 2021-02-04 PROCEDURE — 92928 PRQ TCAT PLMT NTRAC ST 1 LES: CPT | Performed by: INTERNAL MEDICINE

## 2021-02-04 PROCEDURE — 85027 COMPLETE CBC AUTOMATED: CPT

## 2021-02-04 PROCEDURE — 2580000003 HC RX 258: Performed by: INTERNAL MEDICINE

## 2021-02-04 PROCEDURE — 2500000003 HC RX 250 WO HCPCS

## 2021-02-04 PROCEDURE — 92978 ENDOLUMINL IVUS OCT C 1ST: CPT

## 2021-02-04 PROCEDURE — 6360000002 HC RX W HCPCS

## 2021-02-04 PROCEDURE — 93458 L HRT ARTERY/VENTRICLE ANGIO: CPT | Performed by: INTERNAL MEDICINE

## 2021-02-04 PROCEDURE — 2709999900 HC NON-CHARGEABLE SUPPLY

## 2021-02-04 PROCEDURE — 2580000003 HC RX 258

## 2021-02-04 PROCEDURE — 4A033BC MEASUREMENT OF ARTERIAL PRESSURE, CORONARY, PERCUTANEOUS APPROACH: ICD-10-PCS | Performed by: INTERNAL MEDICINE

## 2021-02-04 PROCEDURE — 93571 IV DOP VEL&/PRESS C FLO 1ST: CPT | Performed by: INTERNAL MEDICINE

## 2021-02-04 PROCEDURE — C1769 GUIDE WIRE: HCPCS

## 2021-02-04 PROCEDURE — C1894 INTRO/SHEATH, NON-LASER: HCPCS

## 2021-02-04 PROCEDURE — 027034Z DILATION OF CORONARY ARTERY, ONE ARTERY WITH DRUG-ELUTING INTRALUMINAL DEVICE, PERCUTANEOUS APPROACH: ICD-10-PCS | Performed by: INTERNAL MEDICINE

## 2021-02-04 PROCEDURE — 93571 IV DOP VEL&/PRESS C FLO 1ST: CPT

## 2021-02-04 PROCEDURE — C1753 CATH, INTRAVAS ULTRASOUND: HCPCS

## 2021-02-04 PROCEDURE — 6360000004 HC RX CONTRAST MEDICATION: Performed by: INTERNAL MEDICINE

## 2021-02-04 PROCEDURE — 93458 L HRT ARTERY/VENTRICLE ANGIO: CPT

## 2021-02-04 PROCEDURE — 92978 ENDOLUMINL IVUS OCT C 1ST: CPT | Performed by: INTERNAL MEDICINE

## 2021-02-04 PROCEDURE — 94760 N-INVAS EAR/PLS OXIMETRY 1: CPT

## 2021-02-04 PROCEDURE — 92979 ENDOLUMINL IVUS OCT C EA: CPT | Performed by: INTERNAL MEDICINE

## 2021-02-04 PROCEDURE — C9600 PERC DRUG-EL COR STENT SING: HCPCS

## 2021-02-04 RX ORDER — SODIUM CHLORIDE 0.9 % (FLUSH) 0.9 %
10 SYRINGE (ML) INJECTION EVERY 12 HOURS SCHEDULED
Status: DISCONTINUED | OUTPATIENT
Start: 2021-02-04 | End: 2021-02-04 | Stop reason: SDUPTHER

## 2021-02-04 RX ORDER — ACETAMINOPHEN 325 MG/1
650 TABLET ORAL EVERY 4 HOURS PRN
Status: DISCONTINUED | OUTPATIENT
Start: 2021-02-04 | End: 2021-02-05 | Stop reason: HOSPADM

## 2021-02-04 RX ORDER — SODIUM CHLORIDE 0.9 % (FLUSH) 0.9 %
10 SYRINGE (ML) INJECTION PRN
Status: DISCONTINUED | OUTPATIENT
Start: 2021-02-04 | End: 2021-02-04 | Stop reason: SDUPTHER

## 2021-02-04 RX ORDER — ASPIRIN 81 MG/1
81 TABLET, CHEWABLE ORAL DAILY
Status: DISCONTINUED | OUTPATIENT
Start: 2021-02-04 | End: 2021-02-04

## 2021-02-04 RX ORDER — SODIUM CHLORIDE 9 MG/ML
INJECTION, SOLUTION INTRAVENOUS CONTINUOUS
Status: ACTIVE | OUTPATIENT
Start: 2021-02-04 | End: 2021-02-05

## 2021-02-04 RX ADMIN — TICAGRELOR 90 MG: 90 TABLET ORAL at 21:01

## 2021-02-04 RX ADMIN — ASPIRIN 81 MG: 81 TABLET, CHEWABLE ORAL at 21:01

## 2021-02-04 RX ADMIN — INSULIN LISPRO 2 UNITS: 100 INJECTION, SOLUTION INTRAVENOUS; SUBCUTANEOUS at 21:03

## 2021-02-04 RX ADMIN — NITROGLYCERIN 0.5 INCH: 20 OINTMENT TOPICAL at 06:08

## 2021-02-04 RX ADMIN — INSULIN LISPRO 2 UNITS: 100 INJECTION, SOLUTION INTRAVENOUS; SUBCUTANEOUS at 12:12

## 2021-02-04 RX ADMIN — IOPAMIDOL 106 ML: 755 INJECTION, SOLUTION INTRAVENOUS at 16:34

## 2021-02-04 RX ADMIN — Medication 10 ML: at 08:37

## 2021-02-04 RX ADMIN — PANTOPRAZOLE SODIUM 40 MG: 40 TABLET, DELAYED RELEASE ORAL at 06:08

## 2021-02-04 RX ADMIN — EZETIMIBE 10 MG: 10 TABLET ORAL at 08:40

## 2021-02-04 RX ADMIN — MULTIPLE VITAMINS W/ MINERALS TAB 1 TABLET: TAB at 08:37

## 2021-02-04 RX ADMIN — SODIUM CHLORIDE: 9 INJECTION, SOLUTION INTRAVENOUS at 22:02

## 2021-02-04 RX ADMIN — METOPROLOL TARTRATE 25 MG: 25 TABLET, FILM COATED ORAL at 08:37

## 2021-02-04 RX ADMIN — METOPROLOL TARTRATE 25 MG: 25 TABLET, FILM COATED ORAL at 21:01

## 2021-02-04 RX ADMIN — AMLODIPINE BESYLATE 5 MG: 5 TABLET ORAL at 08:37

## 2021-02-04 RX ADMIN — NITROGLYCERIN 0.5 INCH: 20 OINTMENT TOPICAL at 12:12

## 2021-02-04 RX ADMIN — ACETAMINOPHEN 650 MG: 325 TABLET ORAL at 08:37

## 2021-02-04 RX ADMIN — SODIUM CHLORIDE: 9 INJECTION, SOLUTION INTRAVENOUS at 18:08

## 2021-02-04 RX ADMIN — ATORVASTATIN CALCIUM 80 MG: 80 TABLET, FILM COATED ORAL at 21:01

## 2021-02-04 ASSESSMENT — PAIN SCALES - GENERAL
PAINLEVEL_OUTOF10: 0
PAINLEVEL_OUTOF10: 0
PAINLEVEL_OUTOF10: 3

## 2021-02-04 NOTE — PRE SEDATION
Sedation Pre-Procedure Note    Patient Name: Celine Velasco   YOB: 1950  Room/Bed: N8J-8193/5128-01  Medical Record Number: [id number]  Date: 2/4/2021   Time: 4:27 PM       Indication:  nstemi    Consent: I have discussed with the patient and/or the patient representative the indication, alternatives, and the possible risks and/or complications of the planned procedure and the anesthesia methods. The patient and/or patient representative appear to understand and agree to proceed. Vital Signs:   Vitals:    02/04/21 1143   BP: 115/72   Pulse: 78   Resp: 16   Temp: 98.2 °F (36.8 °C)   SpO2: 92%       Past Medical History:   has a past medical history of Cerebral artery occlusion with cerebral infarction ([facility] [address] Diabetes mellitus ([facility] [address] Heart attack ([facility] [address] and Hypertension. Past Surgical History:   has a past surgical history that includes Cardiac surgery; hernia repair; and Cholecystectomy. Medications:   Scheduled Meds:    amLODIPine  5 mg Oral Daily    aspirin  81 mg Oral Nightly    atorvastatin  80 mg Oral Nightly    ezetimibe  10 mg Oral Daily    sodium chloride flush  10 mL Intravenous 2 times per day    metoprolol tartrate  25 mg Oral BID    therapeutic multivitamin-minerals  1 tablet Oral Daily    pantoprazole  40 mg Oral QAM AC    insulin lispro  0-6 Units Subcutaneous TID WC    insulin lispro  0-3 Units Subcutaneous Nightly    nitroglycerin  0.5 inch Topical 4 times per day    influenza virus vaccine  0.5 mL Intramuscular Prior to discharge     Continuous Infusions:    heparin (PORCINE) Infusion 10 mL/hr (02/03/21 1639)    dextrose       PRN Meds: nitroGLYCERIN, glucose, dextrose, glucagon (rDNA), dextrose, sodium chloride flush, promethazine **OR** ondansetron, acetaminophen **OR** acetaminophen, polyethylene glycol  Home Meds:   Prior to Admission medications    Medication Sig Start Date End Date Taking?  Authorizing Provider   ezetimibe (ZETIA) 10 MG tablet Take 10 mg by mouth daily 12/14/20  Yes Historical Provider, MD   JARDIANCE 25 MG tablet Take 12.5 mg by mouth daily 12/14/20  Yes Historical Provider, MD   metoprolol tartrate (LOPRESSOR) 25 MG tablet Take 25 mg by mouth 2 times daily   Yes Historical Provider, MD   amLODIPine (NORVASC) 10 MG tablet Take 5 mg by mouth daily   Yes Historical Provider, MD   atorvastatin (LIPITOR) 80 MG tablet Take 80 mg by mouth nightly    Yes Historical Provider, MD   omeprazole (PRILOSEC) 20 MG delayed release capsule Take 20 mg by mouth Daily   Yes Historical Provider, MD   metFORMIN (GLUCOPHAGE) 1000 MG tablet Take 1,000 mg by mouth 2 times daily (with meals)   Yes Historical Provider, MD   aspirin 81 MG tablet Take 81 mg by mouth nightly    Yes Historical Provider, MD   losartan (COZAAR) 50 MG tablet Take 50 mg by mouth daily   Yes Historical Provider, MD   glipiZIDE (GLUCOTROL) 10 MG tablet Take 10 mg by mouth 2 times daily (before meals)   Yes Historical Provider, MD   acetaminophen (TYLENOL) 650 MG extended release tablet Take 650 mg by mouth every 8 hours as needed for Pain   Yes Historical Provider, MD   Multiple Vitamins-Minerals (THERAPEUTIC MULTIVITAMIN-MINERALS) tablet Take 1 tablet by mouth daily   Yes Historical Provider, MD     Coumadin Use Last 7 Days:  no  Antiplatelet drug therapy use last 7 days: yes - asa  Other anticoagulant use last 7 days: no  Additional Medication Information:  n/a      Pre-Sedation Documentation and Exam:   I have personally completed a history, physical exam & review of systems for this patient (see notes).     Mallampati Airway Assessment:  Mallampati Class I - (soft palate, fauces, uvula & anterior/posterior tonsillar pillars are visible)    Prior History of Anesthesia Complications:   none    ASA Classification:  Class 3 - A patient with severe systemic disease that limits activity but is not incapacitating    Sedation/ Anesthesia Plan:   intravenous sedation    Medications Planned:   midazolam (Versed) intravenously    Patient is an appropriate candidate for plan of sedation: yes    Electronically signed by Evelina Mills MD on 2/4/2021 at 4:27 PM

## 2021-02-04 NOTE — PROGRESS NOTES
Clinical Pharmacy Note  Heparin Dosing       Lab Results   Component Value Date    APTT 69.6 02/03/2021     Lab Results   Component Value Date    HGB 12.6 02/03/2021    HCT 40.3 02/03/2021     02/03/2021    INR 1.10 02/19/2020       Current Infusion Rate: 10 mL/hr    Plan:  Rate: Continue 10 mL/hr  Next aPTT: 0530  2/4/21    Pharmacy will continue to monitor and adjust based on aPTT results.     Eric Raygoza, PharmD  2/4/2021 12:18 AM

## 2021-02-04 NOTE — PLAN OF CARE
Problem: DAILY CARE  Goal: Daily care needs are met  2/4/2021 0128 by Michele Coates RN  Outcome: Ongoing  Pt expresses no needs at this time. Problem: Pain:  Goal: Pain level will decrease  Description: Pain level will decrease  Outcome: Ongoing  Pt able to report pain appropriately. Pt satisfied with pain level. Problem: Cardiac Output - Decreased:  Goal: Hemodynamic stability will improve  Description: Hemodynamic stability will improve  Outcome: Ongoing  VS stable. Problem: Serum Glucose Level - Abnormal:  Goal: Ability to maintain appropriate glucose levels will improve  Description: Ability to maintain appropriate glucose levels will improve  Outcome: Ongoing   Blood sugars checked according to policy and covered with insulin sliding scale.

## 2021-02-04 NOTE — PROGRESS NOTES
Hospitalist Progress Note      PCP: No primary care provider on file. Date of Admission: 2/3/2021    Chief Complaint: Chest pain and pressure    Hospital Course:   9year-old male retired [employer]  history of CAD s/p stents in 2007 at [facility], type 2 diabetes non-insulin-dependent presented with chest pain and pressure  Troponin elevated, started on heparin drip for NSTEMI management  Cardiology consulted for left heart cath    Subjective:   He says the Nitropaste is helping with the pain he no longer has pain but still has about 3 out of 10 pressure      Medications:  Reviewed    Infusion Medications    heparin (PORCINE) Infusion 10 mL/hr (02/03/21 3064)    dextrose       Scheduled Medications    amLODIPine  5 mg Oral Daily    aspirin  81 mg Oral Nightly    atorvastatin  80 mg Oral Nightly    ezetimibe  10 mg Oral Daily    sodium chloride flush  10 mL Intravenous 2 times per day    metoprolol tartrate  25 mg Oral BID    therapeutic multivitamin-minerals  1 tablet Oral Daily    pantoprazole  40 mg Oral QAM AC    insulin lispro  0-6 Units Subcutaneous TID WC    insulin lispro  0-3 Units Subcutaneous Nightly    nitroglycerin  0.5 inch Topical 4 times per day    influenza virus vaccine  0.5 mL Intramuscular Prior to discharge     PRN Meds: nitroGLYCERIN, glucose, dextrose, glucagon (rDNA), dextrose, sodium chloride flush, promethazine **OR** ondansetron, acetaminophen **OR** acetaminophen, polyethylene glycol      Intake/Output Summary (Last 24 hours) at 2/4/2021 1050  Last data filed at 2/4/2021 0600  Gross per 24 hour   Intake 1260 ml   Output 800 ml   Net 460 ml       Physical Exam Performed:    BP (!) 142/82   Pulse 83   Temp 98.2 °F (36.8 °C) (Oral)   Resp 18   Wt 204 lb 5.9 oz (92.7 kg)   SpO2 94%   BMI 27.72 kg/m²     General appearance: No apparent distress, appears stated age and cooperative. HEENT:  Normal cephalic, atraumatic without obvious deformity.  Pupils equal, round, and reactive to light. Extra ocular muscles intact. Conjunctivae/corneas clear. Neck: Supple, with full range of motion. No jugular venous distention. Trachea midline. Respiratory:  Normal respiratory effort. Clear to auscultation, bilaterally without Rales/Wheezes/Rhonchi. Cardiovascular:  Regular rate and rhythm with normal S1/S2 without murmurs, rubs or gallops. Abdomen: Soft, non-tender, non-distended with normal bowel sounds. Musculoskeletal:  No clubbing, cyanosis or edema bilaterally. Full range of motion without deformity. Skin: Skin color, texture, turgor normal.  No rashes or lesions. Neurologic:  Neurovascularly intact without any focal sensory/motor deficits. Cranial nerves: II-XII intact, grossly non-focal.  Psychiatric:  Alert and oriented, thought content appropriate, normal insight  Capillary Refill: Brisk,< 3 seconds   Peripheral Pulses: +2 palpable, equal bilaterally     Labs:   Recent Labs     02/03/21  1202 02/04/21  0514   WBC 10.5 8.5   HGB 12.6* 11.3*   HCT 40.3* 35.9*    185     Recent Labs     02/03/21  1202      K 4.2      CO2 21   BUN 15   CREATININE 1.4*   CALCIUM 9.4     Recent Labs     02/03/21  1202   AST 16   ALT 16   BILITOT 0.3   ALKPHOS 84     No results for input(s): INR in the last 72 hours.   Recent Labs     02/03/21  1507 02/03/21  1849 02/03/21  2132   TROPONINI 0.03* 0.06* 0.07*       Urinalysis:      Lab Results   Component Value Date    NITRU Negative 02/18/2020    WBCUA 0 02/18/2020    RBCUA 1 02/18/2020    BLOODU Negative 02/18/2020    SPECGRAV >1.030 02/18/2020    GLUCOSEU >=1000 02/18/2020       Radiology:  XR CHEST PORTABLE   Final Result   No active cardiopulmonary disease                 Assessment/Plan:    Active Hospital Problems    Diagnosis Date Noted    NSTEMI (non-ST elevated myocardial infarction) ([facility] [address] [I21.4] 02/03/2021     NSTEMI, chest pain midsternal, EKG with normal sinus with PVCs, initial troponin negative but follow-up 0.06  Pain improved with nitroglycerin paste, continue to have chest pressure  Continue aspirin, high intensity statin  Continue Nitropaste 0.5 inches every 6 hours  Continue heparin drip  Cardiology consulted plan for left heart cath this afternoon  N.p.o. for the procedure    Hypertension  Continue home metoprolol 25 mg twice daily, Norvasc 5 mg once daily    Type 2 diabetes  A1c 8.2  Continue low-dose Lantus and insulin, hypoglycemia protocol    Hyperlipidemia  Continue home statin, Zetia    DVT Prophylaxis: Heparin gtt  Diet: Diet NPO Effective Now  Code Status: Full Code    PT/OT Eval Status: N/A    Dispo -continue inpatient care plan for left heart cath likely discharge 1 to 2 days    Chris Farias MD  Hospitalist

## 2021-02-04 NOTE — PROGRESS NOTES
Clinical Pharmacy Note  Heparin Dosing       Lab Results   Component Value Date    APTT 61.0 02/04/2021     Lab Results   Component Value Date    HGB 11.3 02/04/2021    HCT 35.9 02/04/2021     02/04/2021    INR 1.10 02/19/2020       Current Infusion Rate: 10 mL/hr    Plan:  Rate: Continue 10 mL/hr  Next aPTT: 0600  2/5/21    Pharmacy will continue to monitor and adjust based on aPTT results.     Fannie Oliva PharmD  2/4/2021 6:21 AM

## 2021-02-04 NOTE — OP NOTE
[address]   Procedure Note    CLINICAL HISTORY:       Roula Soto is a 79 y.o. male with a history of coronary artery disease. He was admitted with complaints of chest pain. Cardiac markers were positive. EKG showed nonspecific ST-T wave abnormality. Patient Active Problem List   Diagnosis    Metabolic acidemia    Controlled type 2 diabetes mellitus without complication, without long-term current use of insulin ([facility]    Acute gastroenteritis    Acute onset of severe vertigo    NSTEMI (non-ST elevated myocardial infarction) ([facility]       Prior to Admission medications    Medication Sig Start Date End Date Taking?  Authorizing Provider   ezetimibe (ZETIA) 10 MG tablet Take 10 mg by mouth daily 12/14/20  Yes Historical Provider, MD   JARDIANCE 25 MG tablet Take 12.5 mg by mouth daily 12/14/20  Yes Historical Provider, MD   metoprolol tartrate (LOPRESSOR) 25 MG tablet Take 25 mg by mouth 2 times daily   Yes Historical Provider, MD   amLODIPine (NORVASC) 10 MG tablet Take 5 mg by mouth daily   Yes Historical Provider, MD   atorvastatin (LIPITOR) 80 MG tablet Take 80 mg by mouth nightly    Yes Historical Provider, MD   omeprazole (PRILOSEC) 20 MG delayed release capsule Take 20 mg by mouth Daily   Yes Historical Provider, MD   metFORMIN (GLUCOPHAGE) 1000 MG tablet Take 1,000 mg by mouth 2 times daily (with meals)   Yes Historical Provider, MD   aspirin 81 MG tablet Take 81 mg by mouth nightly    Yes Historical Provider, MD   losartan (COZAAR) 50 MG tablet Take 50 mg by mouth daily   Yes Historical Provider, MD   glipiZIDE (GLUCOTROL) 10 MG tablet Take 10 mg by mouth 2 times daily (before meals)   Yes Historical Provider, MD   acetaminophen (TYLENOL) 650 MG extended release tablet Take 650 mg by mouth every 8 hours as needed for Pain   Yes Historical Provider, MD   Multiple Vitamins-Minerals (THERAPEUTIC MULTIVITAMIN-MINERALS) tablet Take 1 tablet by mouth daily   Yes Historical Provider, MD Prior Procedures/Dates:  PTCA: yes  2007  CABG: no  ETT/Stress: no  Echo: no  Ischemia yes    Other significant clinical information:  Dye Allergy no  Coumadin no  Metformin  yes    Creatinine >1.5 no     The risks, benefits, and details of the procedure were explained to the patient. The patient verbalized understanding and wanted to proceed. Informed written consent was obtained. INDICATION:  NSTEMI     PROCEDURES PERFORMED:   [facility]  PCI  IVUS  FFR     PROCEDURE TECHNIQUE:  The patient was approached from the right radial artery using a 5-6  Setswana slender sheath. Left coronary angiography was done using a Bruno L3.5 diagnostic catheter. Right coronary angiography was done using a Bruno R4 guide catheter. CONTRAST:  Total of 106 cc. COMPLICATIONS:  None. At the end of the procedure a TR device was used for hemostasis. EBL: 10 cc    HEMODYNAMICS:  Aortic pressure was 130/80;  LVEDP 5. There was no gradient between the left ventricle and aorta. ANGIOGRAM/CORONARY ARTERIOGRAM:       The left main coronary artery is patent,     The left anterior descending artery has prox stent that is patent, just distal is a 90% lesion. The left circumflex artery has a mid 60% lesion . The right coronary artery has mild diffuse disease . INTERVENTION  1. XB3.5 Guide   2. Runthrough wire used to cross LAD lesion   3. predilation performed with 3.0 regular balloon   4. IVUS passed from left main to mid LAD, distal reference diameter was 3.5 mm, left  Main widely patent   5. Rosi 3.5 X 15 mm GINA deployed to mid LAD  6. Post dilation performed with 4.0 NC balloon to 15 LUIS   7. Runthrough was then used to cross Lcx lesion   8. FFR performed ( 0.88) ; physiologically negative     SUMMARY:   NSTEMI  S/p successful IVUS guided PCI mid LAD X 1 GINA   FFR left circumflex negative     RECOMMENDATION:     1. Recommend beta blockade, high potency statin, aspirin and brilinta for 12 months  2. Referral to cardiac rehab placed  3. Patient has been advised on the importance of regular exercise of at least 20-30 minutes daily.    4. Follow up in 1-2 weeks with cardiology    D/c in AM     Jigna Gardner MD [address] [facility], Interventional Cardiology, and Peripheral Vascular [address] [facility]   (C): [phone]  (O): [phone]

## 2021-02-05 ENCOUNTER — TELEPHONE (OUTPATIENT)
Dept: CARDIOLOGY CLINIC | Age: 71
End: 2021-02-05

## 2021-02-05 VITALS
RESPIRATION RATE: 14 BRPM | HEART RATE: 56 BPM | DIASTOLIC BLOOD PRESSURE: 78 MMHG | BODY MASS INDEX: 27.8 KG/M2 | OXYGEN SATURATION: 97 % | WEIGHT: 205.25 LBS | HEIGHT: 72 IN | SYSTOLIC BLOOD PRESSURE: 118 MMHG | TEMPERATURE: 98.1 F

## 2021-02-05 DIAGNOSIS — I21.4 NSTEMI (NON-ST ELEVATED MYOCARDIAL INFARCTION) (HCC): Primary | ICD-10-CM

## 2021-02-05 LAB
ALBUMIN SERPL-MCNC: 3.9 G/DL (ref 3.4–5)
ANION GAP SERPL CALCULATED.3IONS-SCNC: 12 MMOL/L (ref 3–16)
BUN BLDV-MCNC: 12 MG/DL (ref 7–20)
CALCIUM SERPL-MCNC: 9.1 MG/DL (ref 8.3–10.6)
CHLORIDE BLD-SCNC: 105 MMOL/L (ref 99–110)
CHOLESTEROL, TOTAL: 88 MG/DL (ref 0–199)
CO2: 23 MMOL/L (ref 21–32)
CREAT SERPL-MCNC: 1.4 MG/DL (ref 0.8–1.3)
GFR AFRICAN AMERICAN: >60
GFR NON-AFRICAN AMERICAN: 50
GLUCOSE BLD-MCNC: 134 MG/DL (ref 70–99)
GLUCOSE BLD-MCNC: 143 MG/DL (ref 70–99)
GLUCOSE BLD-MCNC: 235 MG/DL (ref 70–99)
HCT VFR BLD CALC: 36.9 % (ref 40.5–52.5)
HDLC SERPL-MCNC: 32 MG/DL (ref 40–60)
HEMOGLOBIN: 11.8 G/DL (ref 13.5–17.5)
LDL CHOLESTEROL CALCULATED: 38 MG/DL
MCH RBC QN AUTO: 25.1 PG (ref 26–34)
MCHC RBC AUTO-ENTMCNC: 32 G/DL (ref 31–36)
MCV RBC AUTO: 78.5 FL (ref 80–100)
PDW BLD-RTO: 16.5 % (ref 12.4–15.4)
PERFORMED ON: ABNORMAL
PERFORMED ON: ABNORMAL
PHOSPHORUS: 3 MG/DL (ref 2.5–4.9)
PLATELET # BLD: 189 K/UL (ref 135–450)
PMV BLD AUTO: 9 FL (ref 5–10.5)
POTASSIUM SERPL-SCNC: 4.4 MMOL/L (ref 3.5–5.1)
RBC # BLD: 4.71 M/UL (ref 4.2–5.9)
SODIUM BLD-SCNC: 140 MMOL/L (ref 136–145)
TRIGL SERPL-MCNC: 89 MG/DL (ref 0–150)
VLDLC SERPL CALC-MCNC: 18 MG/DL
WBC # BLD: 7.7 K/UL (ref 4–11)

## 2021-02-05 PROCEDURE — 94760 N-INVAS EAR/PLS OXIMETRY 1: CPT

## 2021-02-05 PROCEDURE — 99232 SBSQ HOSP IP/OBS MODERATE 35: CPT | Performed by: NURSE PRACTITIONER

## 2021-02-05 PROCEDURE — 6370000000 HC RX 637 (ALT 250 FOR IP): Performed by: INTERNAL MEDICINE

## 2021-02-05 PROCEDURE — 6360000002 HC RX W HCPCS: Performed by: INTERNAL MEDICINE

## 2021-02-05 PROCEDURE — 2580000003 HC RX 258: Performed by: INTERNAL MEDICINE

## 2021-02-05 PROCEDURE — 80061 LIPID PANEL: CPT

## 2021-02-05 PROCEDURE — G0008 ADMIN INFLUENZA VIRUS VAC: HCPCS | Performed by: INTERNAL MEDICINE

## 2021-02-05 PROCEDURE — 85027 COMPLETE CBC AUTOMATED: CPT

## 2021-02-05 PROCEDURE — 90686 IIV4 VACC NO PRSV 0.5 ML IM: CPT | Performed by: INTERNAL MEDICINE

## 2021-02-05 PROCEDURE — 36415 COLL VENOUS BLD VENIPUNCTURE: CPT

## 2021-02-05 PROCEDURE — 80069 RENAL FUNCTION PANEL: CPT

## 2021-02-05 RX ADMIN — Medication 10 ML: at 08:46

## 2021-02-05 RX ADMIN — PANTOPRAZOLE SODIUM 40 MG: 40 TABLET, DELAYED RELEASE ORAL at 05:50

## 2021-02-05 RX ADMIN — MULTIPLE VITAMINS W/ MINERALS TAB 1 TABLET: TAB at 08:45

## 2021-02-05 RX ADMIN — METOPROLOL TARTRATE 25 MG: 25 TABLET, FILM COATED ORAL at 08:45

## 2021-02-05 RX ADMIN — EZETIMIBE 10 MG: 10 TABLET ORAL at 08:49

## 2021-02-05 RX ADMIN — INFLUENZA A VIRUS A/VICTORIA/2454/2019 IVR-207 (H1N1) ANTIGEN (PROPIOLACTONE INACTIVATED), INFLUENZA A VIRUS A/HONG KONG/2671/2019 IVR-208 (H3N2) ANTIGEN (PROPIOLACTONE INACTIVATED), INFLUENZA B VIRUS B/VICTORIA/705/2018 BVR-11 ANTIGEN (PROPIOLACTONE INACTIVATED), INFLUENZA B VIRUS B/PHUKET/3073/2013 BVR-1B ANTIGEN (PROPIOLACTONE INACTIVATED) 0.5 ML: 15; 15; 15; 15 INJECTION, SUSPENSION INTRAMUSCULAR at 11:39

## 2021-02-05 RX ADMIN — TICAGRELOR 90 MG: 90 TABLET ORAL at 08:45

## 2021-02-05 RX ADMIN — INSULIN LISPRO 1 UNITS: 100 INJECTION, SOLUTION INTRAVENOUS; SUBCUTANEOUS at 08:50

## 2021-02-05 RX ADMIN — AMLODIPINE BESYLATE 5 MG: 5 TABLET ORAL at 08:45

## 2021-02-05 ASSESSMENT — PAIN SCALES - GENERAL: PAINLEVEL_OUTOF10: 0

## 2021-02-05 NOTE — PLAN OF CARE
Problem: PAIN  Goal: Patient's pain/discomfort is manageable  2/5/2021 0103 by Migue Anderson RN  Outcome: Ongoing  Pt denies pain. Problem: SKIN INTEGRITY  Goal: Skin integrity is maintained or improved  2/5/2021 0103 by Migue Anderson RN  Outcome: Ongoing  No skin issues noted. Pt able to turn self in bed and ambulate to bathroom. Problem: Cardiac Output - Decreased:  Goal: Hemodynamic stability will improve  Description: Hemodynamic stability will improve  2/5/2021 0103 by Migue Anderson RN  Outcome: Ongoing  VS stable. Problem: Serum Glucose Level - Abnormal:  Goal: Ability to maintain appropriate glucose levels will improve  Description: Ability to maintain appropriate glucose levels will improve  2/5/2021 0103 by Migue Anderson RN  Outcome: Ongoing  Pt's blood sugars checked and covered with insulin as ordered.

## 2021-02-05 NOTE — PLAN OF CARE
Post procedure site check completed. Procedure site is within normal limits and appears to be free of complications. All concerns were reviewed and questions answered. Patient verbalized understanding.        Electronically signed by Talita Major RN on 2/5/2021 at 8:57 AM

## 2021-02-05 NOTE — CONSULTS
meals)   Yes Historical Provider, MD   aspirin 81 MG tablet Take 81 mg by mouth nightly    Yes Historical Provider, MD   losartan (COZAAR) 50 MG tablet Take 50 mg by mouth daily   Yes Historical Provider, MD   glipiZIDE (GLUCOTROL) 10 MG tablet Take 10 mg by mouth 2 times daily (before meals)   Yes Historical Provider, MD   acetaminophen (TYLENOL) 650 MG extended release tablet Take 650 mg by mouth every 8 hours as needed for Pain   Yes Historical Provider, MD   Multiple Vitamins-Minerals (THERAPEUTIC MULTIVITAMIN-MINERALS) tablet Take 1 tablet by mouth daily   Yes Historical Provider, MD          Allergies:  Lovastatin     Review of Systems:   · Constitutional: no unanticipated weight loss. There's been no change in energy level, sleep pattern, or activity level. No fevers, chills. · Eyes: No visual changes or diplopia. No scleral icterus. · ENT: No Headaches, hearing loss or vertigo. No mouth sores or sore throat. · Cardiovascular: as reviewed in HPI  · Respiratory: No cough or wheezing, no sputum production. No hemoptysis. · Gastrointestinal: No abdominal pain, appetite loss, blood in stools. No change in bowel or bladder habits. · Genitourinary: No dysuria, trouble voiding, or hematuria. · Musculoskeletal:  No gait disturbance, no joint complaints. · Integumentary: No rash or pruritis. · Neurological: No headache, diplopia, change in muscle strength, numbness or tingling. · Psychiatric: No anxiety or depression. · Endocrine: No temperature intolerance. No excessive thirst, fluid intake, or urination. No tremor. · Hematologic/Lymphatic: No abnormal bruising or bleeding, blood clots or swollen lymph nodes. · Allergic/Immunologic: No nasal congestion or hives.     Objective:   PHYSICAL EXAM:    Vitals:    02/04/21 1959   BP: 130/75   Pulse: 80   Resp: 18   Temp: 98.3 °F (36.8 °C)   SpO2: 96%    Weight: 204 lb 5.9 oz (92.7 kg)       General Appearance:  Alert, cooperative, no distress, appears stated Continuous  nitroGLYCERIN (NITROSTAT) SL tablet 0.4 mg, 0.4 mg, Sublingual, Q5 Min PRN  amLODIPine (NORVASC) tablet 5 mg, 5 mg, Oral, Daily  aspirin chewable tablet 81 mg, 81 mg, Oral, Nightly  atorvastatin (LIPITOR) tablet 80 mg, 80 mg, Oral, Nightly  ezetimibe (ZETIA) tablet 10 mg, 10 mg, Oral, Daily  glucose (GLUTOSE) 40 % oral gel 15 g, 15 g, Oral, PRN  dextrose 50 % IV solution, 12.5 g, Intravenous, PRN  glucagon (rDNA) injection 1 mg, 1 mg, Intramuscular, PRN  dextrose 5 % solution, 100 mL/hr, Intravenous, PRN  sodium chloride flush 0.9 % injection 10 mL, 10 mL, Intravenous, 2 times per day  sodium chloride flush 0.9 % injection 10 mL, 10 mL, Intravenous, PRN  promethazine (PHENERGAN) tablet 12.5 mg, 12.5 mg, Oral, Q6H PRN **OR** ondansetron (ZOFRAN) injection 4 mg, 4 mg, Intravenous, Q6H PRN  [DISCONTINUED] acetaminophen (TYLENOL) tablet 650 mg, 650 mg, Oral, Q6H PRN **OR** acetaminophen (TYLENOL) suppository 650 mg, 650 mg, Rectal, Q6H PRN  polyethylene glycol (GLYCOLAX) packet 17 g, 17 g, Oral, Daily PRN  metoprolol tartrate (LOPRESSOR) tablet 25 mg, 25 mg, Oral, BID  therapeutic multivitamin-minerals 1 tablet, 1 tablet, Oral, Daily  pantoprazole (PROTONIX) tablet 40 mg, 40 mg, Oral, QAM AC  insulin lispro (HUMALOG) injection vial 0-6 Units, 0-6 Units, Subcutaneous, TID WC  insulin lispro (HUMALOG) injection vial 0-3 Units, 0-3 Units, Subcutaneous, Nightly  influenza quadrivalent split vaccine (FLUZONE;FLUARIX;FLULAVAL;AFLURIA) injection 0.5 mL, 0.5 mL, Intramuscular, Prior to discharge     Cardiac testing     EKG: NSR, PVC's, NSSTTW's    Echo:     Stress Test:     Cath:      All above diagnostic testing was independently visualized and reviewed by me (not simply review of report)     Patient Active Problem List   Diagnosis    Metabolic acidemia    Controlled type 2 diabetes mellitus without complication, without long-term current use of insulin ([facility]    Acute gastroenteritis    Acute onset of severe vertigo    NSTEMI (non-ST elevated myocardial infarction) ([facility]         Assessment and Plan   1) NSTEMI  -coronary angiogram today   -This procedure has been fully reviewed with the patient and written informed consent has been obtained. Thank you for allowing us to participate in the care of Theodora Terry.     Aleksander Montenegro MD [address] [facility],  Interventional Cardiology, and Peripheral Vascular Disease   [facility]   (C): [phone]  Benjie Galvin): [phone]

## 2021-02-05 NOTE — CARE COORDINATION
DISCHARGE PLAN: Pt plans to return home with his spouse. Brilinta filled at [facility]. Wife to transport home.   ___________________________________  Met w/pt to address barriers to dc. HOME: Pt reported that he resides in a two story home with his spouse and sister in law. Pt stated that is [address] and he stays on the main floor of the home. Disease Specific: NSTEMI    DME/O2: None. No DME needs indicated. ACTIVE SERVICES: Pt reported that he remains independent with all aspects of his care and stated that he and his spouse continue to travel and work as \". And . Tiffanie Porras. \"  Pt stated that he plans to return home and does not anticipate the need for any assistance. TRANSPORTATION: Pt is an active  and reported that his spouse will transport him home at d/c. PHARMACY: denies difficulty obtaining/taking meds. Pt stated that he typically has all meds filled at the [place], but will have his Brilinta filled at [address] for the first script and then f/u at the [place]    DEMOGRAPHICS: verified address/phone number as correct    INSURANCE: Medicare/[facility]    HD/PD: No    THERAPY RECS: Not ordered, pt is ambulatory and reported no concerns. Discharge planning team will remain available for needs. Please consult for any specifics not addressed in this note.     Robert Schwarz [place]  [phone]  Electronically signed by Sharona Torres on 2/5/2021 at 11:16 AM

## 2021-02-05 NOTE — PROGRESS NOTES
Discharge instructions reviewed with pt/family, discussed medications, VU, denies any further questions or anxiety related to discharge. Educational handouts in regards to new medication, given via [employer], side effects discussed, VU. IV/tele discontinued. Pt discharged to home with wife. Taken from room via wc by pca, personal belongings taken with. Follow up appointment made with olivia for patient. New medications supplied through outpatient pharmacy.

## 2021-02-05 NOTE — PLAN OF CARE
Patient assessed for fall risk; fall precautions initiated. Patient and family instructed about safety devices. Environment kept free of clutter and adequate lighting provided. Bed locked and in lowest position. Call light within reach. Will continue to monitor. Pain/discomfort being managed with PRN analgesics per MD orders. Pt able to express presence and absence of pain and rate pain appropriately using numerical scale. Skin assessment completed every shift. Pt assessed for incontinence, appropriate barrier cream applied prn. Pt encouraged to turn/rotate every 2 hours. Assistance provided if pt unable to do so themselves. Pain/discomfort being managed with PRN analgesics per MD orders. Pt able to express presence and absence of pain and rate pain appropriately using numerical scale.     Intake/Output Summary (Last 24 hours) at 2/5/2021 0746  Last data filed at 2/5/2021 0554  Gross per 24 hour   Intake 800 ml   Output 1350 ml   Net -550 ml     Vitals:    02/05/21 0714   BP: (!) 149/89   Pulse: 79   Resp: 14   Temp: 98.1 °F (36.7 °C)   SpO2: 97%

## 2021-02-05 NOTE — DISCHARGE SUMMARY
Hospital Medicine Discharge Summary    Patient ID: Madison Davis      Patient's PCP: No primary care provider on file. Admit Date: 2/3/2021     Discharge Date:   02/05/2021    Admitting Physician: Juan Nicholson MD     Discharge Physician: Juan Nicholson MD     Discharge Diagnoses:  1. Non-ST segment elevation myocardial infraction  2. Hypertension  3. Type 2 diabetes  4. Hyperlipidemia    The patient was seen and examined on day of discharge and this discharge summary is in conjunction with any daily progress note from day of discharge. Hospital Course:   66-year-old male [employer]  history of CAD s/p stents in 2007 at [facility], type 2 diabetes non-insulin-dependent presented to the hospital with chest pain and pressure. He had troponin elevation admitted for NSTEMI management. His chest pain is classic for unstable angina. Underwent cardiac cath results as below. After monitoring 24 hours after cardiac cath patient discharged home in stable condition. ANGIOGRAM/CORONARY ARTERIOGRAM:        The left main coronary artery is patent,      The left anterior descending artery has prox stent that is patent, just distal is a 90% lesion.     The left circumflex artery has a mid 60% lesion .     The right coronary artery has mild diffuse disease .        INTERVENTION  1. XB3.5 Guide   2. Runthrough wire used to cross LAD lesion   3. predilation performed with 3.0 regular balloon   4. IVUS passed from left main to mid LAD, distal reference diameter was 3.5 mm, left  Main widely patent   5. Rosi 3.5 X 15 mm GINA deployed to mid LAD  6. Post dilation performed with 4.0 NC balloon to 15 LUIS   7. Runthrough was then used to cross Lcx lesion   8.  FFR performed ( 0.88) ; physiologically negative      SUMMARY:   NSTEMI  S/p successful IVUS guided PCI mid LAD X 1 GINA   FFR left circumflex negative         Physical Exam Performed:     /78   Pulse 56   Temp 98.1 °F (36.7 °C) (Oral) Resp 14   Ht 6' (1.829 m)   Wt 205 lb 4 oz (93.1 kg)   SpO2 97%   BMI 27.84 kg/m²       General appearance:  No apparent distress, appears stated age and cooperative. HEENT:  Normal cephalic, atraumatic without obvious deformity. Pupils equal, round, and reactive to light. Extra ocular muscles intact. Conjunctivae/corneas clear. Neck: Supple, with full range of motion. No jugular venous distention. Trachea midline. Respiratory:  Normal respiratory effort. Clear to auscultation, bilaterally without Rales/Wheezes/Rhonchi. Cardiovascular:  Regular rate and rhythm with normal S1/S2 without murmurs, rubs or gallops. Abdomen: Soft, non-tender, non-distended with normal bowel sounds. Musculoskeletal:  No clubbing, cyanosis or edema bilaterally. Full range of motion without deformity. Skin: Skin color, texture, turgor normal.  No rashes or lesions. Neurologic:  Neurovascularly intact without any focal sensory/motor deficits. Cranial nerves: II-XII intact, grossly non-focal.  Psychiatric:  Alert and oriented, thought content appropriate, normal insight  Capillary Refill: Brisk,< 3 seconds   Peripheral Pulses: +2 palpable, equal bilaterally       Labs:  For convenience and continuity at follow-up the following most recent labs are provided:      CBC:    Lab Results   Component Value Date    WBC 7.7 02/05/2021    HGB 11.8 02/05/2021    HCT 36.9 02/05/2021     02/05/2021       Renal:    Lab Results   Component Value Date     02/05/2021    K 4.4 02/05/2021    K 4.2 02/03/2021     02/05/2021    CO2 23 02/05/2021    BUN 12 02/05/2021    CREATININE 1.4 02/05/2021    CALCIUM 9.1 02/05/2021    PHOS 3.0 02/05/2021         Significant Diagnostic Studies    Radiology:   XR CHEST PORTABLE   Final Result   No active cardiopulmonary disease                Consults:     IP CONSULT TO CARDIOLOGY  IP CONSULT TO CARDIAC REHAB    Disposition: Home    Condition at Discharge: Stable    Discharge Instructions/Follow-up:    Follow-up with cardiology  Carb controlled diet    Code Status:  Full Code     Activity: activity as tolerated    Diet: diabetic diet      Discharge Medications:     Current Discharge Medication List           Details   ticagrelor (BRILINTA) 90 MG TABS tablet Take 1 tablet by mouth 2 times daily  Qty: 60 tablet, Refills: 11              Details   ezetimibe (ZETIA) 10 MG tablet Take 10 mg by mouth daily      JARDIANCE 25 MG tablet Take 12.5 mg by mouth daily      metoprolol tartrate (LOPRESSOR) 25 MG tablet Take 25 mg by mouth 2 times daily      amLODIPine (NORVASC) 10 MG tablet Take 5 mg by mouth daily      atorvastatin (LIPITOR) 80 MG tablet Take 80 mg by mouth nightly       omeprazole (PRILOSEC) 20 MG delayed release capsule Take 20 mg by mouth Daily      metFORMIN (GLUCOPHAGE) 1000 MG tablet Take 1,000 mg by mouth 2 times daily (with meals)      aspirin 81 MG tablet Take 81 mg by mouth nightly       glipiZIDE (GLUCOTROL) 10 MG tablet Take 10 mg by mouth 2 times daily (before meals)      acetaminophen (TYLENOL) 650 MG extended release tablet Take 650 mg by mouth every 8 hours as needed for Pain      Multiple Vitamins-Minerals (THERAPEUTIC MULTIVITAMIN-MINERALS) tablet Take 1 tablet by mouth daily             Time Spent on discharge is more than 30 minutes in the examination, evaluation, counseling and review of medications and discharge plan. Signed:    Haleigh Owen MD   2/5/2021      Thank you No primary care provider on file. for the opportunity to be involved in this patient's care. If you have any questions or concerns please feel free to contact me at [phone].

## 2021-02-05 NOTE — PROGRESS NOTES
[facility]   Daily Progress Note      Admit Date:  2/3/2021    Reason for follow up visit: NSTEMI    CC: \"I feel great. \"    80 y/o male with PMH significant for CAD/S/P PCI of LAD in 2007, type II diabetes mellitus, HTN and HLP admitted to [facility] with c/o chest pain. The pain was relieved with rest and SL NTG. On 2/4/2021 he underwent coronary angiogram/PCI of LAD. Interval History:  Pt. seen and examined; records reviewed  BP noted. Denies chest pain or SOB  Ambulating without complaints    Subjective:  Pt with no acute overnight cardiac events. Denies chest pain, SOB, cough, palpitations or dizziness    Review of Systems:   · Constitutional: no unanticipated weight loss. There's been no change in energy level, sleep pattern, or activity level. No fevers, chills. · Eyes: No visual changes or diplopia. No scleral icterus. · ENT: No Headaches, hearing loss or vertigo. No mouth sores or sore throat. · Cardiovascular: as reviewed in HPI  · Respiratory: No cough or wheezing, no sputum production. No hematemesis. · Gastrointestinal: No abdominal pain, appetite loss, blood in stools. No change in bowel or bladder habits. · Genitourinary: No dysuria, trouble voiding, or hematuria. · Musculoskeletal:  No gait disturbance, no joint complaints. · Integumentary: No rash or pruritis. · Neurological: No headache, diplopia, change in muscle strength, numbness or tingling. · Psychiatric: No anxiety or depression. · Endocrine: No temperature intolerance. No excessive thirst, fluid intake, or urination. No tremor. · Hematologic/Lymphatic: No abnormal bruising or bleeding, blood clots or swollen lymph nodes. · Allergic/Immunologic: No nasal congestion or hives.     Objective:   BP (!) 149/89   Pulse 79   Temp 98.1 °F (36.7 °C) (Oral)   Resp 14   Ht 6' (1.829 m)   Wt 205 lb 4 oz (93.1 kg)   SpO2 97%   BMI 27.84 kg/m²       Intake/Output Summary (Last 24 hours) at 2/5/2021 9430  Last data filed at 2/5/2021 0554  Gross per 24 hour   Intake 800 ml   Output 1350 ml   Net -550 ml     Wt Readings from Last 3 Encounters:   02/05/21 205 lb 4 oz (93.1 kg)   02/19/20 206 lb 9.1 oz (93.7 kg)       Physical Exam:  General: In no acute distress. Awake, alert, and oriented X4. Up in room  Skin:  Warm and dry. No new appearing rashes or lesions. Neck:  Supple. No JVD appreciated. Chest: Lungs clear to auscultation. No wheezes/rhonchi/rales  Cardiovascular:  RRR. Normal S1 and S2. No murmur/gallop or rub   Abdomen:  soft, nontender, nondistended, +bowel sounds. No hepatomegaly  Extremities:  No LE edema. No clubbing or cyanosis. R radial site with small area of ecchymosis. No hematoma. 2+ bilateral radial/DP/PT pulses. Cap refill brisk. Medications:    ticagrelor  90 mg Oral BID    amLODIPine  5 mg Oral Daily    aspirin  81 mg Oral Nightly    atorvastatin  80 mg Oral Nightly    ezetimibe  10 mg Oral Daily    sodium chloride flush  10 mL Intravenous 2 times per day    metoprolol tartrate  25 mg Oral BID    therapeutic multivitamin-minerals  1 tablet Oral Daily    pantoprazole  40 mg Oral QAM AC    insulin lispro  0-6 Units Subcutaneous TID WC    insulin lispro  0-3 Units Subcutaneous Nightly    influenza virus vaccine  0.5 mL Intramuscular Prior to discharge      dextrose       acetaminophen, nitroGLYCERIN, glucose, dextrose, glucagon (rDNA), dextrose, sodium chloride flush, promethazine **OR** ondansetron, [DISCONTINUED] acetaminophen **OR** acetaminophen, polyethylene glycol    Lab Data:  CBC:   Recent Labs     02/03/21  1202 02/04/21  0514 02/05/21  0437   WBC 10.5 8.5 7.7   HGB 12.6* 11.3* 11.8*    185 189     BMP:    Recent Labs     02/03/21  1202      K 4.2   CO2 21   BUN 15   CREATININE 1.4*     LIVR:   Recent Labs     02/03/21  1202   AST 16   ALT 16     INR:  No results for input(s): INR in the last 72 hours.   APTT:   Recent Labs     02/03/21  2324 02/04/21  0514   APTT 69.6* 61.0*     Results for Ruben Harper (MRN [id number]) as of 2/5/2021 09:53   Ref. Range 2/3/2021 12:02 2/3/2021 15:07 2/3/2021 18:49 2/3/2021 21:32   Troponin Latest Ref Range: <0.01 ng/mL <0.01 0.03 (H) 0.06 (H) 0.07 (H)     ECG: SR with low voltage QRS    2/4/2021 Cath/PCI:  The left main coronary artery is patent,    The left anterior descending artery has prox stent that is patent, just distal is a 90% lesion.   The left circumflex artery has a mid 60% lesion .   The right coronary artery has mild diffuse disease . S/p successful IVUS guided PCI mid LAD X 1 GINA   FFR left circumflex negative      Telemetry: Sinus rhythm with occasional PVC    Assessment/Plan:    1. NSTEMI  -S/P GINA to LAD  -LCX 60% stenosis  -continue medical management with DAPT, statin and BB  -risk factor modification  -echo (can be done as outpt to evaluate LV function)    2. Essential HTN  -goal BP < 130/80  -continue medical management    3.  Hyperlipidemia goal LDL < 70  -continue high intensity statin  -also on zetia    Stable and okay for discharge today from cardiac standpoint    Follow up with Dr. Tiera Kemp as scheduled on 2/17/2021 @ 11AM    Post MI/PCI instructions discussed    Cardiac rehab    Discharge Cardiac Meds:  Amlodipine 5 mg daily  ASA 81 mg daily  Atorvastatin 80 mg nightly  Zetia 10 mg daily  Metoprolol tartrate 25 mg BID  Brilinta 90 mg BID      Electronically signed by KARIN Azar CNP on 2/5/2021 at 9:48 AM

## 2021-02-05 NOTE — CONSULTS
[facility]  CARDIOPULMONARY PHASE I CONSULT        NAME:  Amrit Obrien RECORD NUMBER:  [id number]  AGE: 79 y.o.    GENDER: male  : 1950  TODAY'S DATE:  2021    Subjective: awake, alert, pleasant conversationalist     VISIT TYPE: evaluation     ADMITTING PHYSICIAN:  Aniyah Georges MD     PAST MEDICAL HISTORY        Diagnosis Date    [facility] [address] [facility]     Diabetes mellitus ([facility] [facility]     Heart attack ([facility] [facility]     Hypertension        SOCIAL HISTORY    Social History     Tobacco Use    Smoking status: Never Smoker    Smokeless tobacco: Never Used   Substance Use Topics    Alcohol use: Yes     Frequency: Never    Drug use: Never       ALLERGIES    Allergies   Allergen Reactions    Lovastatin      Confusion         MEDICATIONS  Scheduled Meds:   ticagrelor  90 mg Oral BID    amLODIPine  5 mg Oral Daily    aspirin  81 mg Oral Nightly    atorvastatin  80 mg Oral Nightly    ezetimibe  10 mg Oral Daily    sodium chloride flush  10 mL Intravenous 2 times per day    metoprolol tartrate  25 mg Oral BID    therapeutic multivitamin-minerals  1 tablet Oral Daily    pantoprazole  40 mg Oral QAM AC    insulin lispro  0-6 Units Subcutaneous TID WC    insulin lispro  0-3 Units Subcutaneous Nightly    influenza virus vaccine  0.5 mL Intramuscular Prior to discharge       ADMIT DATE: 2/3/2021      Objective:     ADMISSION DIAGNOSIS:   NSTEMI (non-ST elevated myocardial infarction) ([facility]) [I21.4]     BP (!) 149/89   Pulse 79   Temp 98.1 °F (36.7 °C) (Oral)   Resp 14   Ht 6' (1.829 m)   Wt 205 lb 4 oz (93.1 kg)   SpO2 97%   BMI 27.84 kg/m²     ADMIT:  Weight: 203 lb 11.3 oz (92.4 kg)    TODAY: Weight: 205 lb 4 oz (93.1 kg)    Wt Readings from Last 3 Encounters:   21 205 lb 4 oz (93.1 kg)   20 206 lb 9.1 oz (93.7 kg)        ECHOCARDIOGRAM: no results in current medical record    HgBA1c:  No components found for: HGBA1C  LIPID PANEL:  No results found for: CHOL, HDL, TRIG     Assessment:     CONSULTS:   IP CONSULT TO CARDIOLOGY  IP CONSULT TO CARDIAC REHAB    Patient has a CARDIOLOGY CONSULT: Yes        EDUCATION STATUS: Patient   [x]  Provided both written and verbal education on Cardiopulmonary Rehabilitation. []  Provided instructions for smoking cessation programs. [x]  Provided education of CAD risk factors. [x]  Provided recommendations on activity and exercise. [x]  Provided education on medications. [x]  Provided education on coronary anatomy and coronary interventions. []  Other:    CURRENT DIET: DIET CARDIAC; No Caffeine    EDUCATIONAL PACKETS PROVIDED- PRINTED FROM [facility]. Titles and material given:  Yes   [x]  Managing Heart Disease and Preventing Stroke  []  Heart Owner's Manual  []  Living Well with Heart Failure  []  Other:     PATIENT/CAREGIVER TEACHING: patient   Level of patient/caregiver understanding able to:   [x] Verbalize understanding   [] Demonstrate understanding       [] Teach back        [] Needs reinforcement     []  Other:       TEACHING TIME:  15 minutes       Plan:       DISCHARGE PLAN:  Placement for patient upon discharge: home with support   Hospice Care:  no  Code Status: Full Code  Discharge appointment scheduled: No     RECOMMENDATIONS:   [x]  Patient/Family instructed to call Cardiopulmonary Rehab ([phone]) upon discharge schedule the initial evaluation  [x]  Encourage to call Cardiopulmonary Rehabilitation with any questions. []  Referral to alternate Cardiopulmonary Rehabilitation facility.    []  Other:    [] Appointment scheduled for: patient will schedule his initial visit after his follow up visit with his cardiologist; follow up call from cardiac rehab will occur in 2 weeks to determine appropriateness of scheduling   [x] Chooses to not schedule at this time          Electronically signed by Carlos Enrique Palacio RN,MS on 2/5/2021 at 9:01 AM

## 2021-02-05 NOTE — CARE COORDINATION
Discharge Planning:  MISSAEL contacted the [place] notification hotline. [phone]. Ref #[id number].   Electronically signed by Asia Paul on 2/5/2021 at 4:27 PM

## 2021-02-10 NOTE — TELEPHONE ENCOUNTER
Attempts to contact patient have failed. Patient has follow up appointment Wednesday, February 17th with   Dr. Greg Etienne. Message placed on appointment notes that we were unable to contact patient to schedule Echo.
Patient discharged today prior to Echo being done. I spoke to Ernie Cruz in cardiology department to confirm patient could have Echo the day he comes for follow up appointment on 2/17/21. Please call patient and verify he can come at 0930 on 2/17/21 for echo, then call scheduling to schedule. Order for Echo in [address] [place] [address] Rd.   Thanks
Tried both numbers on pt chart, one was disconnected and the other had a full VM. Pt does not have HIPPA for our office.
Tried both numbers on pt chart, one was disconnected and the other had a full VM. Pt does not have HIPPA for our office.     Will try again later.
Tried both numbers on pt chart, one was disconnected and the other had a full VM. Pt does not have HIPPA for our office. Will try again later.
98.8

## 2021-02-26 ENCOUNTER — OFFICE VISIT (OUTPATIENT)
Dept: CARDIOLOGY CLINIC | Age: 71
End: 2021-02-26
Payer: OTHER GOVERNMENT

## 2021-02-26 VITALS
WEIGHT: 200 LBS | OXYGEN SATURATION: 99 % | BODY MASS INDEX: 27.09 KG/M2 | DIASTOLIC BLOOD PRESSURE: 86 MMHG | HEIGHT: 72 IN | TEMPERATURE: 97.1 F | SYSTOLIC BLOOD PRESSURE: 138 MMHG | HEART RATE: 97 BPM

## 2021-02-26 DIAGNOSIS — I25.10 CORONARY ARTERY DISEASE INVOLVING NATIVE CORONARY ARTERY OF NATIVE HEART WITHOUT ANGINA PECTORIS: ICD-10-CM

## 2021-02-26 DIAGNOSIS — E78.5 HYPERLIPIDEMIA LDL GOAL <70: ICD-10-CM

## 2021-02-26 DIAGNOSIS — I10 ESSENTIAL HYPERTENSION: Primary | ICD-10-CM

## 2021-02-26 DIAGNOSIS — N52.8 OTHER MALE ERECTILE DYSFUNCTION: ICD-10-CM

## 2021-02-26 DIAGNOSIS — I73.9 CLAUDICATION (HCC): ICD-10-CM

## 2021-02-26 PROCEDURE — 99214 OFFICE O/P EST MOD 30 MIN: CPT | Performed by: INTERNAL MEDICINE

## 2021-02-26 PROCEDURE — 93000 ELECTROCARDIOGRAM COMPLETE: CPT | Performed by: INTERNAL MEDICINE

## 2021-02-26 NOTE — PROGRESS NOTES
Cardiology Consultation     Ze Contreras  1950      Primary Care Physician: None  Reason for Referral: CAD  Chief Complaint:   Chief Complaint   Patient presents with    Follow-Up from Hospital     s/p PCI/LHC/NSTEMI    Anxiety     wife thinks he needs anxiety medicine    Sweats     excessive sweating at night       Subjective:     History of Present Illness: The patient is 79 y.o. male with a past medical history significant for CAD, hyperlipidemia, hypertension and type 2 diabetes. He presented to [facility] with complaints of chest pain. He underwent LHC on 2/4/21 resulting in GINA to mid LAD. He presents today for follow up. He does report shortness of breath today and admits this has worsened for him over the past few weeks. He admits to waking with night sweats that have occurred 3 times since discharge. He denies pain in his legs with rest or exertion. He denies numbness or tingling in his legs or feet. He does admit to erectile dysfunction. Prior cardiac testing:    EKG:     Cath 2/4/21  HEMODYNAMICS:  Aortic pressure was 130/80;  LVEDP 5. There was no gradient between the left ventricle and aorta.       ANGIOGRAM/CORONARY ARTERIOGRAM:        The left main coronary artery is patent,      The left anterior descending artery has prox stent that is patent, just distal is a 90% lesion.     The left circumflex artery has a mid 60% lesion .     The right coronary artery has mild diffuse disease .     INTERVENTION  1. XB3.5 Guide   2. Runthrough wire used to cross LAD lesion   3. predilation performed with 3.0 regular balloon   4. IVUS passed from left main to mid LAD, distal reference diameter was 3.5 mm, left  Main widely patent   5. Rosi 3.5 X 15 mm GINA deployed to mid LAD  6. Post dilation performed with 4.0 NC balloon to 15 LUIS   7. Runthrough was then used to cross Lcx lesion   8.  FFR performed ( 0.88) ; physiologically negative      SUMMARY:   NSTEMI  S/p successful IVUS guided PCI mid LAD X 1 GINA   FFR left circumflex negative     Past Medical History:   Diagnosis Date    [facility] ([facility] [address] [facility]     Diabetes mellitus ([facility] [address] [facility]     Heart attack ([facility] [address] [facility]     Hypertension      Past Surgical History:   Procedure Laterality Date    CARDIAC SURGERY      CHOLECYSTECTOMY      HERNIA REPAIR      PTCA       History reviewed. No pertinent family history. Social History     Tobacco Use    Smoking status: Never Smoker    Smokeless tobacco: Never Used   Substance Use Topics    Alcohol use: Yes     Frequency: Never    Drug use: Never       Allergies   Allergen Reactions    Lovastatin      Confusion       Current Outpatient Medications   Medication Sig Dispense Refill    ticagrelor (BRILINTA) 90 MG TABS tablet Take 1 tablet by mouth 2 times daily 60 tablet 11    ezetimibe (ZETIA) 10 MG tablet Take 10 mg by mouth daily      JARDIANCE 25 MG tablet Take 12.5 mg by mouth daily      metoprolol tartrate (LOPRESSOR) 25 MG tablet Take 25 mg by mouth 2 times daily      amLODIPine (NORVASC) 10 MG tablet Take 5 mg by mouth daily      atorvastatin (LIPITOR) 80 MG tablet Take 80 mg by mouth nightly       omeprazole (PRILOSEC) 20 MG delayed release capsule Take 20 mg by mouth Daily      metFORMIN (GLUCOPHAGE) 1000 MG tablet Take 1,000 mg by mouth 2 times daily (with meals)      aspirin 81 MG tablet Take 81 mg by mouth nightly       glipiZIDE (GLUCOTROL) 10 MG tablet Take 10 mg by mouth 2 times daily (before meals)      acetaminophen (TYLENOL) 650 MG extended release tablet Take 650 mg by mouth every 8 hours as needed for Pain      Multiple Vitamins-Minerals (THERAPEUTIC MULTIVITAMIN-MINERALS) tablet Take 1 tablet by mouth daily       No current facility-administered medications for this visit. Review of Systems:  · Constitutional: No unanticipated weight loss. There's been no change in energy level, sleep pattern, or activity level. No fevers, chills. · Eyes: No visual changes or diplopia. No scleral icterus. · ENT: No Headaches, hearing loss or vertigo. No mouth sores or sore throat. · Cardiovascular: as reviewed in HPI  · Respiratory: No cough or wheezing, no sputum production. No hemoptysis. · Gastrointestinal: No abdominal pain, appetite loss, blood in stools. No change in bowel or bladder habits. · Genitourinary: No dysuria, trouble voiding, or hematuria. · Musculoskeletal:  No gait disturbance, no joint complaints. · Integumentary: No rash or pruritis. · Neurological: No headache, diplopia, change in muscle strength, numbness or tingling. · Psychiatric: No anxiety or depression. · Endocrine: No temperature intolerance. No excessive thirst, fluid intake, or urination. No tremor. · Hematologic/Lymphatic: No abnormal bruising or bleeding, blood clots or swollen lymph nodes. · Allergic/Immunologic: No nasal congestion or hives. Physical Exam:   /86   Pulse 97   Temp 97.1 °F (36.2 °C)   Ht 6' (1.829 m)   Wt 200 lb (90.7 kg)   SpO2 99%   BMI 27.12 kg/m²   Wt Readings from Last 3 Encounters:   02/26/21 200 lb (90.7 kg)   02/05/21 205 lb 4 oz (93.1 kg)   02/19/20 206 lb 9.1 oz (93.7 kg)     Constitutional: He is oriented to person, place, and time. He appears well-developed and well-nourished. In no acute distress. Head: Normocephalic and atraumatic. Pupils equal and round. Neck: Neck supple. No JVP or carotid bruit appreciated. No mass and no thyromegaly present. No lymphadenopathy present. Cardiovascular: Normal rate. Normal heart sounds. Exam reveals no gallop and no friction rub. No murmur heard. Pulmonary/Chest: Effort normal and breath sounds normal. No respiratory distress. He has no wheezes, rhonchi or rales. Abdominal: Soft, non-tender. Bowel sounds are normal. He exhibits no organomegaly, mass or bruit. Extremities: No edema. No cyanosis or clubbing. Pulses are 2+ radial/carotid bilaterally.   Neurological: No gross cranial nerve deficit. Coordination normal.   Skin: Skin is warm and dry. There is no rash or diaphoresis. Psychiatric: He has a normal mood and affect. His speech is normal and behavior is normal.     Lab Review:   FLP:    Lab Results   Component Value Date    TRIG 89 02/05/2021    HDL 32 02/05/2021    LDLCALC 38 02/05/2021    LABVLDL 18 02/05/2021     BUN/Creatinine:    Lab Results   Component Value Date    BUN 12 02/05/2021    CREATININE 1.4 02/05/2021     PT/INR, TNI, HGB A1C:   Lab Results   Component Value Date/Time    TROPONINI 0.07 (H) 02/03/2021 09:32 PM    LABA1C 8.2 02/03/2021 06:49 PM      No results found for: CBCAUTODIF        Assessment and Plan   1) CAD   S/p GINA to mid LAD 2/4/21  No symptoms of angina   Continue DAPT with Brilinta and ASA   Complete echocardiogram as previously ordered     2) Hyperlipidemia   LDL 38 2/5/21   Continue statin therapy     3) Essential hypertension   Well controlled today in the office. 4) Type 2 diabetes   Managed per PCP     5) ED   Referral to Dr. Ladan Larios     Follow up in 4 months      Thank you very much for allowing me to participate in the care of your patient. Please do not hesitate to contact me if you have any questions. Cortney Medina MD [address], Interventional Cardiology, and Peripheral Vascular Disease   [address]   (C): [phone]  Bettie Francie): [phone]      This note was scribed in the presence of Cortney Medina MD by Gem Johnson RN. Physician Attestation:  The scribes documentation has been prepared under my direction and personally reviewed by me in its entirety. I confirm the note above accurately reflects all work, treatment, procedures, and medical decision making performed by me.     Electronically signed by Isac Tovar MD on 2/28/2021 at 4:42 PM

## 2021-03-08 ENCOUNTER — TELEPHONE (OUTPATIENT)
Dept: CARDIOLOGY CLINIC | Age: 71
End: 2021-03-08

## 2021-03-08 NOTE — TELEPHONE ENCOUNTER
Oh Rosado called in this afternoon stating he just took his last pill this morning and needs a refill, he can't afford the brilinita right now because it's $400, he wants to know if he can get samples. Oh Rosado also wanted to ask, he's getting his first covid shot tomorrow 03/08. His second shot date is 03/24 , also on 03/24 he's getting two test, lower extremity and echo. Ohtim Rosado wants to know if the shot will effect the tests.       You can reach Oh Rosado at [phone]

## 2021-03-08 NOTE — TELEPHONE ENCOUNTER
Spoke with patient he is aware that samples are ready to be picked up and he can get the vaccine being on medication.

## 2021-03-08 NOTE — TELEPHONE ENCOUNTER
Pt called in stating the Brilinta is costing him $400 for a month supply and he cannot afford that. Pt is out of the medication right now.     Please give him a call at [phone]

## 2021-03-09 NOTE — TELEPHONE ENCOUNTER
Spoke with representative at the [facility] regarding coverage for [address]. They do not have him taking this medication and in order for them to see if this would be covered he would have to call the [place] and speak with the benefits department. Please call him and have him reach out to the [place]. We can send a script to them if needed.

## 2021-03-24 ENCOUNTER — HOSPITAL ENCOUNTER (OUTPATIENT)
Dept: VASCULAR LAB | Age: 71
Discharge: HOME | End: 2021-03-24

## 2021-03-24 ENCOUNTER — HOSPITAL ENCOUNTER (OUTPATIENT)
Dept: NON INVASIVE DIAGNOSTICS | Age: 71
Discharge: HOME | End: 2021-03-24

## 2021-03-24 DIAGNOSIS — I73.9 CLAUDICATION (HCC): ICD-10-CM

## 2021-03-24 DIAGNOSIS — I21.4 NSTEMI (NON-ST ELEVATED MYOCARDIAL INFARCTION) (HCC): ICD-10-CM

## 2021-03-24 DIAGNOSIS — N52.8 OTHER MALE ERECTILE DYSFUNCTION: ICD-10-CM

## 2021-03-24 LAB
LV EF: 63 %
LVEF MODALITY: NORMAL

## 2021-03-24 PROCEDURE — 93306 TTE W/DOPPLER COMPLETE: CPT

## 2021-03-24 PROCEDURE — 93925 LOWER EXTREMITY STUDY: CPT

## 2021-03-26 ENCOUNTER — TELEPHONE (OUTPATIENT)
Dept: CARDIOLOGY CLINIC | Age: 71
End: 2021-03-26

## 2021-03-26 NOTE — TELEPHONE ENCOUNTER
Patient states he is still working with the [employer] to get his medication filled. States he only has 4 days left of the samples given to hi earlier. Sample requested:  ticagrelor (BRILINTA)    Strength:   90 MG TABS tablet    Dosage:    Take 1 tablet by mouth 2 times daily    Patient's call back number:   [phone] and if he doesn't answer please [facility]

## 2021-03-30 ENCOUNTER — TELEPHONE (OUTPATIENT)
Dept: CARDIOLOGY CLINIC | Age: 71
End: 2021-03-30

## 2021-05-24 NOTE — TELEPHONE ENCOUNTER
Samples prepared for patient to . LM on VM letting patient know they were ready for him to . in chair; All fall risk precautions in place;Call light within reach;Nurse notified         Patient Diagnosis(es): There were no encounter diagnoses. has a past medical history of GERD (gastroesophageal reflux disease), Hernia, Obesity, and Unspecified sleep apnea. has a past surgical history that includes Splenectomy (); Total hip arthroplasty (); Lap Band (); hernia repair ();  section (); and craniotomy (Right, 2021). Restrictions  Position Activity Restriction  Other position/activity restrictions: Ambulate, Up with assist, Pt to wear helmet when OOB, OK to remove for shower, HOB to elevated at 30*  Subjective   General  Chart Reviewed: Yes  Patient assessed for rehabilitation services?: Yes  Additional Pertinent Hx: 46 y.o. female with hypertension and tobacco abuse who presented after spending a prolonged period on the ground s/p fall out of bed; found to have acute left hemiparesis and gaze deviation. CXR with no acute cardiopulmonary abnormality. CTH revealed large acute/subacute infarct in the R frontal lobe with associated mass effect and 4 mm R to L midline shift. It also noted subdural hemorrhage of 6mm along falx, although NSGY less convinced. 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 Outside window on TPA. Pt is now POD #1 following hemicraniectomy (). Pt admitted to ARU   Response to previous treatment: Patient with no complaints from previous session  Family / Caregiver Present: No  Referring Practitioner:  [facility] AT [facility]  Diagnosis: CVA  Subjective  Subjective: Pt long sitting in bed with SLP present upon arrival, pleasant and agreeable to OT session. General Comment  Comments: Tim Bernard   Vital Signs  Patient Currently in Pain: Yes (4/10 L hip pain and burning sensation in L hand)   Orientation  Orientation  Overall Orientation Status: Within Functional Limits  Objective    ADL Grooming: Modified independent ; Increased time to complete (oral hygiene seated in w/c, pt located items placed to L of sink with + time and no cues)  LE Dressing: Moderate assistance (pt donned R shoe with setup and L shoe with total A)        Balance  Sitting Balance: Supervision (seated EOB and EOM)  Standing Balance  Time: less than 1 minute  Activity: functional transfers  Functional Mobility  Functional - Mobility Device: Wheelchair  Activity: To/from bathroom  Assist Level: Minimal assistance  Functional Mobility Comments: assist to manage over door threshold as pt colliding with doorframe on L side and unable to i'ly correct  Bed mobility  Supine to Sit: Contact guard assistance;Minimal assistance (first trial pt able to manage BLEs and ascend with CGA, second trial pt stating increased fatigue req min A for trunk ascension despite step by step VCs)  Sit to Supine: Contact guard assistance (VC for step by step technique; CGA to clear the LLE onto the mat table)  Comment: Bed mobility performed on mat table  Transfers  Stand Pivot Transfers: Minimal assistance;Contact guard assistance (EOB<w/c, cues for ant lean; w/c<EOM leading to L with CGA; from EOM<w/c pt req + time to process and sequence attempt for scoot pivot to R, stand pivot completed with CGA)  Sit Pivot Transfers: Contact guard assistance (w/c<EOM leading to L)                       Cognition  Overall Cognitive Status: Exceptions  Arousal/Alertness: Appropriate responses to stimuli  Following Commands:  Follows one step commands consistently  Attention Span: Appears intact  Memory: Appears intact  Safety Judgement: Decreased awareness of need for assistance;Decreased awareness of need for safety  Problem Solving: Assistance required to generate solutions;Assistance required to correct errors made;Assistance required to implement solutions;Decreased awareness of errors  Insights: Decreased awareness of deficits  Initiation: Requires cues for some Sequencing: Requires cues for some  Cognition Comment: Pt demonstrating need for increased time to process transfer setup and initiation this date, pt stating, \"I think I am overthinking everything. \"                    Exercises  Shoulder Flexion: Pt completed x 10 in sidelying gravity eliminated plane using side table with use of skate to decrease friction in order to improve LUE motor recovery ROM and strength; pt demo trace activation in shoulder for sh flexion ~5 degrees with muscle tapping to elicit contractions; mostly PROM completed with pt tolerating ~140 degrees with c/o slight discomfort  Finger Extension: x 8 PROM to all digits, increased hypertonicity noted this date and c/o burning sensation to all digits and thenar eminence                  Second Session: Pt sitting in w/c upon arrival, pleasant and agreeable to OT session. Functional transfers: Pt educated on dry tub transfer using TTB with demonstration and step by step VCs. Pt completed stand pivot transfer onto TTB with Min A from w/c<TTB and use of GB upon stance. Pt then required total A to manage LLE into tub despite pt attempt to utilize RLE to assist LLE positioning. Pt educated on use of leg  to increase independence with managing LLE with assist to position onto L foot and still required Max A to manage LLE in/out of tub. Pt able to scoot laterally within TTB with SBA and use of GB on R. Pt completed stand pivot from TTB<w/c leading to L with min A and VCs to sequence, pt req + time to process prior to initiating. Functional mobility: Pt completed w/c mobility propelling self using RUE and RLE with LLE on footrest with spvn assist from therapy gym to room with VCs to scan to left in order to identify environmental barriers. Pt with decreased attention span req min cues to prevent collision d/t decreased insight of pt. Pt initially attempted to go into the wrong room (on pt's right) and req + time to identify error.  Pt then passed pt's room as it was on pt's left, pt turned around and identified correct room. Pt requested to retrieve cheesecake from fridge that her friend brought in. Pt able to propel to dining room fridge with spvn and 0 collisions to open fridge and retrieve cheesecake. Pt did run into door frame on pt's left when exiting dining room req + time to problem solve and correct error. OT donned splint to LUE at EOS with total A in order to decrease risk of contracture d/t LUE hypertonicity, pt c/o discomfort but tolerated well. Pt left in w/c at end of session with chair alarm engaged, call light within reach and all needs met.       Plan   Plan  Times per week: 5x a week for 60 mins daily  Times per day: Daily  Current Treatment Recommendations: Strengthening, Endurance Training, Neuromuscular Re-education, Self-Care / ADL, Functional Mobility Training, Safety Education & Training, ROM, Positioning, Wheelchair Mobility Training, Balance Training, Patient/Caregiver Education & Training, Manual Therapy:  MLD, Equipment Evaluation, Education, & procurement, Home Management Training, Cognitive Reorientation    Goals  Short term goals  Time Frame for Short term goals: 2 weeks  Short term goal 1: Pt will complete toilet transfer w/ Mod A-goal met 5/4  Short term goal 2: Pt will complete UE dressing w/ Mod A-goal met 5/12  Short term goal 3: Pt will complete oral care w/ spvn seated at sink w/o VCs for L sided attention-goal met 5/17  Short term goal 4: Pt will complete LE dressing w/ Mod A-ongoing  Long term goals  Time Frame for Long term goals : 4 weeks-ongoing  Long term goal 1: Pt will complete toilet transfer w/ SBA-ongoing  Long term goal 2: Pt will complete UE dressing w/ setup-ongoing  Long term goal 3: Pt will complete LE dressing w/ SBA-ongoing  Long term goal 4: Pt will be independent w/ tone management exercises to improve functional use of LUE-ongoing  Long term goal 5: Pt will complete oral care I'ly-goal met 5/24  Patient Goals   Patient goals : \"get back to my normal self\"       Therapy Time   Individual Second Session Group Co-treatment   Time In 0930 1100       Time Out 1030  1130       Minutes 60  30       Timed Code Treatment Minutes: 60 Minutes+ 30 Minutes  Total Treatment Time: 90 Minutes       Bharathi Ansari, OT

## 2021-10-07 ENCOUNTER — APPOINTMENT (OUTPATIENT)
Dept: CT IMAGING | Age: 71
DRG: 392 | End: 2021-10-07
Payer: OTHER GOVERNMENT

## 2021-10-07 ENCOUNTER — APPOINTMENT (OUTPATIENT)
Dept: GENERAL RADIOLOGY | Age: 71
DRG: 392 | End: 2021-10-07
Payer: OTHER GOVERNMENT

## 2021-10-07 ENCOUNTER — HOSPITAL ENCOUNTER (INPATIENT)
Age: 71
LOS: 2 days | Discharge: HOME | DRG: 392 | End: 2021-10-09
Attending: EMERGENCY MEDICINE | Admitting: INTERNAL MEDICINE
Payer: OTHER GOVERNMENT

## 2021-10-07 DIAGNOSIS — R55 SYNCOPE AND COLLAPSE: Primary | ICD-10-CM

## 2021-10-07 PROBLEM — E78.5 HYPERLIPIDEMIA: Status: ACTIVE | Noted: 2021-10-07

## 2021-10-07 PROBLEM — R40.20 LOC (LOSS OF CONSCIOUSNESS) (HCC): Status: ACTIVE | Noted: 2021-10-07

## 2021-10-07 PROBLEM — E11.9 DMII (DIABETES MELLITUS, TYPE 2) (HCC): Status: ACTIVE | Noted: 2021-10-07

## 2021-10-07 PROBLEM — I25.10 CAD (CORONARY ARTERY DISEASE): Status: ACTIVE | Noted: 2021-10-07

## 2021-10-07 LAB
ALBUMIN SERPL-MCNC: 4.4 G/DL (ref 3.4–5)
ALP BLD-CCNC: 81 U/L (ref 40–129)
ALT SERPL-CCNC: 16 U/L (ref 10–40)
ANION GAP SERPL CALCULATED.3IONS-SCNC: 16 MMOL/L (ref 3–16)
AST SERPL-CCNC: 12 U/L (ref 15–37)
BASOPHILS ABSOLUTE: 0.1 K/UL (ref 0–0.2)
BASOPHILS RELATIVE PERCENT: 0.6 %
BILIRUB SERPL-MCNC: 0.3 MG/DL (ref 0–1)
BILIRUBIN DIRECT: <0.2 MG/DL (ref 0–0.3)
BILIRUBIN URINE: NEGATIVE
BILIRUBIN, INDIRECT: ABNORMAL MG/DL (ref 0–1)
BLOOD, URINE: NEGATIVE
BUN BLDV-MCNC: 15 MG/DL (ref 7–20)
CALCIUM SERPL-MCNC: 8.9 MG/DL (ref 8.3–10.6)
CHLORIDE BLD-SCNC: 102 MMOL/L (ref 99–110)
CLARITY: CLEAR
CO2: 18 MMOL/L (ref 21–32)
COLOR: YELLOW
CREAT SERPL-MCNC: 1.4 MG/DL (ref 0.8–1.3)
EKG ATRIAL RATE: 89 BPM
EKG DIAGNOSIS: NORMAL
EKG P AXIS: 4 DEGREES
EKG P-R INTERVAL: 142 MS
EKG Q-T INTERVAL: 350 MS
EKG QRS DURATION: 66 MS
EKG QTC CALCULATION (BAZETT): 425 MS
EKG R AXIS: -11 DEGREES
EKG T AXIS: 42 DEGREES
EKG VENTRICULAR RATE: 89 BPM
EOSINOPHILS ABSOLUTE: 0.1 K/UL (ref 0–0.6)
EOSINOPHILS RELATIVE PERCENT: 1.4 %
GFR AFRICAN AMERICAN: >60
GFR NON-AFRICAN AMERICAN: 50
GLUCOSE BLD-MCNC: 171 MG/DL (ref 70–99)
GLUCOSE BLD-MCNC: 184 MG/DL (ref 70–99)
GLUCOSE BLD-MCNC: 199 MG/DL (ref 70–99)
GLUCOSE URINE: >=1000 MG/DL
HCT VFR BLD CALC: 38.8 % (ref 40.5–52.5)
HEMOGLOBIN: 12.2 G/DL (ref 13.5–17.5)
KETONES, URINE: NEGATIVE MG/DL
LEUKOCYTE ESTERASE, URINE: NEGATIVE
LYMPHOCYTES ABSOLUTE: 1.3 K/UL (ref 1–5.1)
LYMPHOCYTES RELATIVE PERCENT: 13.6 %
MCH RBC QN AUTO: 24.3 PG (ref 26–34)
MCHC RBC AUTO-ENTMCNC: 31.4 G/DL (ref 31–36)
MCV RBC AUTO: 77.4 FL (ref 80–100)
MICROSCOPIC EXAMINATION: ABNORMAL
MONOCYTES ABSOLUTE: 0.6 K/UL (ref 0–1.3)
MONOCYTES RELATIVE PERCENT: 6.1 %
NEUTROPHILS ABSOLUTE: 7.2 K/UL (ref 1.7–7.7)
NEUTROPHILS RELATIVE PERCENT: 78.3 %
NITRITE, URINE: NEGATIVE
PDW BLD-RTO: 18 % (ref 12.4–15.4)
PERFORMED ON: ABNORMAL
PERFORMED ON: ABNORMAL
PH UA: 5.5 (ref 5–8)
PLATELET # BLD: 252 K/UL (ref 135–450)
PMV BLD AUTO: 9 FL (ref 5–10.5)
POTASSIUM REFLEX MAGNESIUM: 4.4 MMOL/L (ref 3.5–5.1)
PROTEIN UA: NEGATIVE MG/DL
RBC # BLD: 5.01 M/UL (ref 4.2–5.9)
SODIUM BLD-SCNC: 136 MMOL/L (ref 136–145)
SPECIFIC GRAVITY UA: >1.03 (ref 1–1.03)
T4 FREE: 1.2 NG/DL (ref 0.9–1.8)
TOTAL PROTEIN: 7.1 G/DL (ref 6.4–8.2)
TROPONIN: <0.01 NG/ML
TSH SERPL DL<=0.05 MIU/L-ACNC: 2.34 UIU/ML (ref 0.27–4.2)
URINE REFLEX TO CULTURE: ABNORMAL
URINE TYPE: ABNORMAL
UROBILINOGEN, URINE: 0.2 E.U./DL
WBC # BLD: 9.2 K/UL (ref 4–11)

## 2021-10-07 PROCEDURE — 1200000000 HC SEMI PRIVATE

## 2021-10-07 PROCEDURE — 85025 COMPLETE CBC W/AUTO DIFF WBC: CPT

## 2021-10-07 PROCEDURE — 71045 X-RAY EXAM CHEST 1 VIEW: CPT

## 2021-10-07 PROCEDURE — 99285 EMERGENCY DEPT VISIT HI MDM: CPT

## 2021-10-07 PROCEDURE — 6370000000 HC RX 637 (ALT 250 FOR IP): Performed by: INTERNAL MEDICINE

## 2021-10-07 PROCEDURE — 6360000002 HC RX W HCPCS: Performed by: INTERNAL MEDICINE

## 2021-10-07 PROCEDURE — 84443 ASSAY THYROID STIM HORMONE: CPT

## 2021-10-07 PROCEDURE — 80048 BASIC METABOLIC PNL TOTAL CA: CPT

## 2021-10-07 PROCEDURE — 93010 ELECTROCARDIOGRAM REPORT: CPT | Performed by: INTERNAL MEDICINE

## 2021-10-07 PROCEDURE — 36415 COLL VENOUS BLD VENIPUNCTURE: CPT

## 2021-10-07 PROCEDURE — 80076 HEPATIC FUNCTION PANEL: CPT

## 2021-10-07 PROCEDURE — 2580000003 HC RX 258: Performed by: INTERNAL MEDICINE

## 2021-10-07 PROCEDURE — 93005 ELECTROCARDIOGRAM TRACING: CPT | Performed by: EMERGENCY MEDICINE

## 2021-10-07 PROCEDURE — 84439 ASSAY OF FREE THYROXINE: CPT

## 2021-10-07 PROCEDURE — 84484 ASSAY OF TROPONIN QUANT: CPT

## 2021-10-07 PROCEDURE — 70450 CT HEAD/BRAIN W/O DYE: CPT

## 2021-10-07 PROCEDURE — 6370000000 HC RX 637 (ALT 250 FOR IP): Performed by: EMERGENCY MEDICINE

## 2021-10-07 PROCEDURE — 81003 URINALYSIS AUTO W/O SCOPE: CPT

## 2021-10-07 RX ORDER — ASPIRIN 81 MG/1
81 TABLET, CHEWABLE ORAL NIGHTLY
Status: DISCONTINUED | OUTPATIENT
Start: 2021-10-07 | End: 2021-10-09 | Stop reason: HOSPADM

## 2021-10-07 RX ORDER — ONDANSETRON 2 MG/ML
4 INJECTION INTRAMUSCULAR; INTRAVENOUS EVERY 4 HOURS PRN
Status: DISCONTINUED | OUTPATIENT
Start: 2021-10-07 | End: 2021-10-09 | Stop reason: HOSPADM

## 2021-10-07 RX ORDER — DEXTROSE MONOHYDRATE 50 MG/ML
100 INJECTION, SOLUTION INTRAVENOUS PRN
Status: DISCONTINUED | OUTPATIENT
Start: 2021-10-07 | End: 2021-10-09 | Stop reason: HOSPADM

## 2021-10-07 RX ORDER — SODIUM CHLORIDE 0.9 % (FLUSH) 0.9 %
10 SYRINGE (ML) INJECTION PRN
Status: DISCONTINUED | OUTPATIENT
Start: 2021-10-07 | End: 2021-10-09 | Stop reason: HOSPADM

## 2021-10-07 RX ORDER — SODIUM CHLORIDE 0.9 % (FLUSH) 0.9 %
10 SYRINGE (ML) INJECTION EVERY 12 HOURS SCHEDULED
Status: DISCONTINUED | OUTPATIENT
Start: 2021-10-07 | End: 2021-10-09 | Stop reason: HOSPADM

## 2021-10-07 RX ORDER — SODIUM CHLORIDE 9 MG/ML
25 INJECTION, SOLUTION INTRAVENOUS PRN
Status: DISCONTINUED | OUTPATIENT
Start: 2021-10-07 | End: 2021-10-09 | Stop reason: HOSPADM

## 2021-10-07 RX ORDER — ATORVASTATIN CALCIUM 80 MG/1
80 TABLET, FILM COATED ORAL NIGHTLY
Status: DISCONTINUED | OUTPATIENT
Start: 2021-10-07 | End: 2021-10-09 | Stop reason: HOSPADM

## 2021-10-07 RX ORDER — ERYTHROMYCIN 5 MG/G
OINTMENT OPHTHALMIC NIGHTLY
COMMUNITY
Start: 2021-09-30 | End: 2022-10-01

## 2021-10-07 RX ORDER — PANTOPRAZOLE SODIUM 40 MG/1
40 TABLET, DELAYED RELEASE ORAL
Status: DISCONTINUED | OUTPATIENT
Start: 2021-10-08 | End: 2021-10-09 | Stop reason: HOSPADM

## 2021-10-07 RX ORDER — NICOTINE POLACRILEX 4 MG
15 LOZENGE BUCCAL PRN
Status: DISCONTINUED | OUTPATIENT
Start: 2021-10-07 | End: 2021-10-09 | Stop reason: HOSPADM

## 2021-10-07 RX ORDER — DEXTROSE MONOHYDRATE 25 G/50ML
12.5 INJECTION, SOLUTION INTRAVENOUS PRN
Status: DISCONTINUED | OUTPATIENT
Start: 2021-10-07 | End: 2021-10-09 | Stop reason: HOSPADM

## 2021-10-07 RX ORDER — ACETAMINOPHEN 325 MG/1
650 TABLET ORAL ONCE
Status: COMPLETED | OUTPATIENT
Start: 2021-10-07 | End: 2021-10-07

## 2021-10-07 RX ORDER — ACETAMINOPHEN 325 MG/1
650 TABLET ORAL EVERY 4 HOURS PRN
Status: DISCONTINUED | OUTPATIENT
Start: 2021-10-07 | End: 2021-10-09 | Stop reason: HOSPADM

## 2021-10-07 RX ORDER — AMLODIPINE BESYLATE 5 MG/1
5 TABLET ORAL DAILY
Status: DISCONTINUED | OUTPATIENT
Start: 2021-10-08 | End: 2021-10-09 | Stop reason: HOSPADM

## 2021-10-07 RX ORDER — POLYETHYLENE GLYCOL 3350 17 G/17G
17 POWDER, FOR SOLUTION ORAL DAILY PRN
Status: DISCONTINUED | OUTPATIENT
Start: 2021-10-07 | End: 2021-10-09 | Stop reason: HOSPADM

## 2021-10-07 RX ORDER — ACETAMINOPHEN 650 MG/1
650 SUPPOSITORY RECTAL EVERY 4 HOURS PRN
Status: DISCONTINUED | OUTPATIENT
Start: 2021-10-07 | End: 2021-10-09 | Stop reason: HOSPADM

## 2021-10-07 RX ADMIN — ATORVASTATIN CALCIUM 80 MG: 80 TABLET, FILM COATED ORAL at 21:36

## 2021-10-07 RX ADMIN — SODIUM CHLORIDE, PRESERVATIVE FREE 10 ML: 5 INJECTION INTRAVENOUS at 21:36

## 2021-10-07 RX ADMIN — INSULIN LISPRO 1 UNITS: 100 INJECTION, SOLUTION INTRAVENOUS; SUBCUTANEOUS at 21:37

## 2021-10-07 RX ADMIN — METOPROLOL TARTRATE 25 MG: 25 TABLET, FILM COATED ORAL at 21:36

## 2021-10-07 RX ADMIN — ENOXAPARIN SODIUM 40 MG: 40 INJECTION SUBCUTANEOUS at 21:37

## 2021-10-07 RX ADMIN — ASPIRIN 81 MG: 81 TABLET, CHEWABLE ORAL at 21:36

## 2021-10-07 RX ADMIN — ACETAMINOPHEN 650 MG: 325 TABLET ORAL at 21:55

## 2021-10-07 RX ADMIN — TICAGRELOR 90 MG: 90 TABLET ORAL at 21:36

## 2021-10-07 RX ADMIN — ACETAMINOPHEN 650 MG: 325 TABLET ORAL at 13:06

## 2021-10-07 ASSESSMENT — PAIN DESCRIPTION - LOCATION
LOCATION: HEAD

## 2021-10-07 ASSESSMENT — PAIN DESCRIPTION - PAIN TYPE
TYPE: ACUTE PAIN
TYPE: ACUTE PAIN

## 2021-10-07 ASSESSMENT — PAIN DESCRIPTION - PROGRESSION
CLINICAL_PROGRESSION: RESOLVED
CLINICAL_PROGRESSION: NOT CHANGED

## 2021-10-07 ASSESSMENT — PAIN SCALES - GENERAL
PAINLEVEL_OUTOF10: 2
PAINLEVEL_OUTOF10: 2
PAINLEVEL_OUTOF10: 0
PAINLEVEL_OUTOF10: 5
PAINLEVEL_OUTOF10: 5
PAINLEVEL_OUTOF10: 0
PAINLEVEL_OUTOF10: 5

## 2021-10-07 ASSESSMENT — PAIN DESCRIPTION - DESCRIPTORS
DESCRIPTORS: SQUEEZING
DESCRIPTORS: HEADACHE;PRESSURE
DESCRIPTORS: SQUEEZING

## 2021-10-07 ASSESSMENT — PAIN DESCRIPTION - ORIENTATION: ORIENTATION: POSTERIOR

## 2021-10-07 NOTE — ACP (ADVANCE CARE PLANNING)
Advance Care Planning     Advance Care Planning Activator (Inpatient)  Conversation Note      Date of ACP Conversation: 10/7/2021     Conversation Conducted with: Patient with Decision Making Capacity    ACP Activator: Sampson De Nazario [address] Decision Maker:     Current Designated Health Care Decision Maker:     Primary Decision Maker: Kimmy Rosenberg - Spouse - [phone]    Today we documented Decision Maker(s) consistent with Legal Next of Kin hierarchy. Care Preferences    Ventilation: \"If you were in your present state of health and suddenly became very ill and were unable to breathe on your own, what would your preference be about the use of a ventilator (breathing machine) if it were available to you? \"      Would the patient desire the use of ventilator (breathing machine)?: yes    \"If your health worsens and it becomes clear that your chance of recovery is unlikely, what would your preference be about the use of a ventilator (breathing machine) if it were available to you? \"     Would the patient desire the use of ventilator (breathing machine)?: No      Resuscitation  \"CPR works best to restart the heart when there is a sudden event, like a heart attack, in someone who is otherwise healthy. Unfortunately, CPR does not typically restart the heart for people who have serious health conditions or who are very sick. \"    \"In the event your heart stopped as a result of an underlying serious health condition, would you want attempts to be made to restart your heart (answer \"yes\" for attempt to resuscitate) or would you prefer a natural death (answer \"no\" for do not attempt to resuscitate)? \" yes       [x] Yes   [] No   Educated Patient / Jax Benítez regarding differences between Advance Directives and portable DNR orders.     Length of ACP Conversation in minutes:  5    Conversation Outcomes:  [x] ACP discussion completed  [] Existing advance directive reviewed with patient; no changes to patient's previously recorded wishes  [] New Advance Directive completed  [] Portable Do Not Rescitate prepared for Provider review and signature  [] POLST/POST/MOLST/MOST prepared for Provider review and signature      Follow-up plan:    [] Schedule follow-up conversation to continue planning  [] Referred individual to Provider for additional questions/concerns   [] Advised patient/agent/surrogate to review completed ACP document and update if needed with changes in condition, patient preferences or care setting    [x] This note routed to one or more involved healthcare providers

## 2021-10-07 NOTE — PROGRESS NOTES
discontinued, still active on [place] medication list  Latter stated that one medication was increased from a 1/2 tablet to whole tablet, this appears to be empagliflozin      Laya Hernandez [facility], PharmD   10/7/2021 4:06 PM

## 2021-10-07 NOTE — CARE COORDINATION
INITIAL CASE MANAGEMENT ASSESSMENT    Met with patient to assess possible discharge needs. Explained Case Management role/services. Living Situation: in a house with his wife and his sister lives in apartment in lower level. They have 4 dogs and 4 cats. ADLs: Independent, 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 [facility]    [facility]      HD/PD: N/A    PLAN/COMMENTS: Return home with wife( Mrs. Shilo Darden) his holiday season is about to begin and he has a lot of work to do. Home care and SNF list given. Provided contact information for patient or family to call with any questions. Will follow and assist as needed. The Plan for Transition of Care is related to the following treatment goals: return home    The Patient and/or patient representative -wife was provided with a choice of provider and agrees   with the discharge plan. [x] Yes [] No    Freedom of choice list was provided with basic dialogue that supports the patient's individualized plan of care/goals, treatment preferences and shares the quality data associated with the providers.  [x] Yes [] No    Electronically signed by NEEL Jason on 10/7/2021 at 6:51 PM

## 2021-10-07 NOTE — ED NOTES
Notified Dr. Ladan Gilmore of pt's pain score and request for Tylenol.       Heather Osorio RN  10/07/21 [id number]

## 2021-10-07 NOTE — ED NOTES
Bed: [facility]  Expected date:   Expected time:   Means of arrival: [facility]  Comments:  70M Syncope     Catalina Serrato RN  10/07/21 1838

## 2021-10-07 NOTE — H&P
Hospital Medicine  History and Physical    PCP: No primary care provider on file. Patient Name: Karen Pichardo    Date of Service: Pt seen/examined on 10/7/21 and admitted to Inpatient with expected LOS greater than two midnights due to medical therapy    CHIEF COMPLAINT:  Pt c/o 2 episodes og LOC this morning  HISTORY OF PRESENT ILLNESS: Pt is an 79y.o. year-old male with a history of hypertension, hyperlipidemia, type II diabetes mellitus and CAD who presents to the emergency room for evaluation following 2 episodes of loss of consciousness earlier today. He states that the 1st episode was unwitnessed. The 2nd occurrence happened when he was with his wife at a restaurant. She states that, \"he just went blank. \" He is being admitted for further evaluation and treatment. Associated signs and symptoms do not include nausea, vomiting, chest pain, shortness of breath, diaphoresis, edema, orthopnea, paroxysmal nocturnal dyspnea, fever or chills. Past Medical History:        Diagnosis Date    [facility]     Diabetes mellitus [facility] [facility]     Heart attack [facility] [facility]     Hypertension        Past Surgical History:        Procedure Laterality Date    CARDIAC SURGERY      CHOLECYSTECTOMY      HERNIA REPAIR      PTCA         Allergies:  Lovastatin, Amoxicillin, Gemfibrozil, Lisinopril, and Simvastatin    Medications Prior to Admission:    Prior to Admission medications    Medication Sig Start Date End Date Taking?  Authorizing Provider   erythromycin [facility]) 5 MG/GM ophthalmic ointment Apply to eye nightly 9/30/21 10/1/22 Yes Historical Provider, MD   ticagrelor (BRILINTA) 90 MG TABS tablet Take 1 tablet by mouth 2 times daily 2/5/21  Yes Tristan Avendaño MD   ezetimibe (ZETIA) 10 MG tablet Take 10 mg by mouth daily 12/14/20  Yes Historical Provider, MD   JARDIANCE 25 MG tablet Take 25 mg by mouth daily  12/14/20  Yes Historical Provider, MD   metoprolol tartrate (LOPRESSOR) 25 MG tablet Take 25 mg by mouth 2 times daily   Yes Historical Provider, MD   amLODIPine (NORVASC) 10 MG tablet Take 5 mg by mouth daily   Yes Historical Provider, MD   atorvastatin (LIPITOR) 80 MG tablet Take 80 mg by mouth nightly    Yes Historical Provider, MD   omeprazole (PRILOSEC) 20 MG delayed release capsule Take 20 mg by mouth Daily   Yes Historical Provider, MD   metFORMIN (GLUCOPHAGE) 1000 MG tablet Take 1,000 mg by mouth 2 times daily (with meals)   Yes Historical Provider, MD   aspirin 81 MG tablet Take 81 mg by mouth nightly    Yes Historical Provider, MD   glipiZIDE (GLUCOTROL) 10 MG tablet Take 10 mg by mouth 2 times daily (before meals)   Yes Historical Provider, MD   Multiple Vitamins-Minerals (THERAPEUTIC MULTIVITAMIN-MINERALS) tablet Take 1 tablet by mouth daily   Yes Historical Provider, MD   acetaminophen (TYLENOL) 650 MG extended release tablet Take 650 mg by mouth every 8 hours as needed for Pain    Historical Provider, MD       Family History:   Family history is negative for accelerated coronary artery disease, diabetes or malignancies. Social History:   TOBACCO:   reports that he has never smoked. He has never used smokeless tobacco.  ETOH:   reports current alcohol use. OCCUPATION:      REVIEW OF SYSTEMS:  A full review of systems was performed and is negative except for that which appears in the HPI    Physical Exam:    Vitals: /76   Pulse 85   Temp 97.7 °F (36.5 °C) (Oral)   Resp 23   Ht 6' (1.829 m)   Wt 212 lb 8.4 oz (96.4 kg)   SpO2 96%   BMI 28.82 kg/m²   General appearance: WD/WN 79y.o. year-old male who is alert, appears stated age and is cooperative  HEENT: Head: Normocephalic, no lesions, without obvious abnormality. Eye: Normal external eye, conjunctiva, lids cornea, PEERL. Ears: Normal external ears. Non-tender. Nose: Normal external nose, mucus membranes and septum. Pharynx: Dental Hygiene adequate. Normal buccal mucosa. Normal pharynx.   Neck: no adenopathy, no carotid bruit, no JVD, supple, symmetrical, trachea midline and thyroid not enlarged, symmetric, no tenderness/mass/nodules  Lungs: clear to auscultation bilaterally and no use of accessory muscles. Heart: regular rate and rhythm, S1, S2 normal, no murmur, click, rub or gallop and normal apical impulse  Abdomen: soft, non-tender; bowel sounds normal; no masses, no organomegaly  Extremities: extremities atraumatic, no cyanosis or edema and Homans sign is negative, no sign of DVT. Capillary Refill: Acceptable < 3 seconds   Peripheral Pulses: +3 easily felt, not easily obliterated with pressures   Skin: Skin color, texture, turgor normal. No rashes or lesions on exposed skin  Neurologic: Neurovascularly intact without any focal sensory/motor deficits. Cranial nerves: II-XII intact, grossly non-focal. Gait was not tested. Mental Status: Alert and oriented, thought content appropriate, normal insight    CBC:   Recent Labs     10/07/21  1115   WBC 9.2   HGB 12.2*        BMP:    Recent Labs     10/07/21  1115      K 4.4      CO2 18*   BUN 15   CREATININE 1.4*   GLUCOSE 184*     Troponin:   Recent Labs     10/07/21  1115   TROPONINI <0.01     PT/INR:  No results found for: PTINR  U/A:    Lab Results   Component Value Date    LEUKOCYTESUR Negative 10/07/2021    RBCUA 1 02/18/2020    SPECGRAV >1.030 10/07/2021    UROBILINOGEN 0.2 10/07/2021    BILIRUBINUR Negative 10/07/2021    BLOODU Negative 10/07/2021    GLUCOSEU >=1000 10/07/2021    PROTEINU Negative 10/07/2021         RAD:   I have independently reviewed and interpreted the imaging studies below and based my recommendations to the patient on those findings.     CT HEAD WO CONTRAST    Result Date: 10/7/2021  EXAMINATION: CT OF THE HEAD WITHOUT CONTRAST  10/7/2021 11:18 am TECHNIQUE: CT of the head was performed without the administration of intravenous contrast. Dose modulation, iterative reconstruction, and/or weight based adjustment of the mA/kV was utilized to reduce the radiation dose to as low as reasonably achievable. COMPARISON: None. HISTORY: ORDERING SYSTEM PROVIDED HISTORY: syncope TECHNOLOGIST PROVIDED HISTORY: Reason for exam:->syncope Has a \"code stroke\" or \"stroke alert\" been called? ->No Decision Support Exception - unselect if not a suspected or confirmed emergency medical condition->Emergency Medical Condition (MA) Reason for Exam: loc Acuity: Acute Type of Exam: Initial FINDINGS: BRAIN/VENTRICLES: There is no acute intracranial hemorrhage, mass effect or midline shift. No abnormal extra-axial fluid collection. The gray-white differentiation is maintained without evidence of an acute infarct. There is no evidence of hydrocephalus. There are chronic atrophic changes consistent with the patient's age. ORBITS: The visualized portion of the orbits demonstrate no acute abnormality. SINUSES: There is mild maxillary sinus mucosal thickening and a right maxillary mucous retention cyst SOFT TISSUES/SKULL:  No acute abnormality of the visualized skull or soft tissues. No acute intracranial abnormality. XR CHEST PORTABLE    Result Date: 10/7/2021  EXAMINATION: ONE XRAY VIEW OF THE CHEST 10/7/2021 11:29 am COMPARISON: Chest radiograph 02/03/2021 HISTORY: ORDERING SYSTEM PROVIDED HISTORY: syncope TECHNOLOGIST PROVIDED HISTORY: Reason for exam:->syncope Reason for Exam: syncope FINDINGS: Lungs are clear. No effusion or pneumothorax. Cardiomediastinal silhouette is normal with mild tortuosity of the descending thoracic aorta. No evidence of an acute cardiopulmonary process.          EKG:   Read by ER in the absence of a Cardiologist shows normal sinus rhythm with rate of 89 bpm and no ST segment elevation      Assessment:   Principal Problem:    LOC (loss of consciousness) ([facility])  Active Problems:    Essential hypertension    DMII (diabetes mellitus, type 2) ([facility])    CAD (coronary artery disease)    Hyperlipidemia  Resolved Problems:    * No resolved hospital problems. *      Plan:       LOC (loss of consciousness) (HCC) - x 2 this morning  - CT of the head unremarkable. EKG/troponin reassuring. Labs show no emergent process. - Reviewed Echocardiogram 03/24/2021  - Carotid dopplers ordered  - Monitor on Telemetry  - Cardiology consulted    CAD (coronary artery disease) - appears stable. Pt denies chest pain. Continue beta blocker, statin and aspirin, Brilinta. Monitor on telemetry. Diabetes mellitus II - SSI and carb control diet    Essential (primary) hypertension - continue home meds and monitor blood pressure    Hyperlipidemia - No current evidence of Rhabdomyolysis or other adverse effects. Hold Zetia and continue statin therapy while in the hospital        DVT Prophylaxis: Lovenox  Diet: Diet NPO Effective Now  Code Status: Prior  (Advanced care planning has been discussed with patient and/or responsible family member and is reflected in the code status.  Further orders associated with this have been entered if appropriate)    Disposition: Anticipate that patient will remain in the hospital for 2 to 3 days depending on further evaluation and clinical course     Please note that over 50 minutes was spent in evaluating the patient, review of records and results, discussion with staff/family, etc.    Radha Desai MD, MD

## 2021-10-07 NOTE — ED PROVIDER NOTES
Triage Chief Complaint:   Loss of Consciousness (Pt c/o two instances of blacking out for seconds. States that after he came to after the second time he was confused and did not recognize his wife. )    [place]:  Narinder Chan is a 79 y.o. male with past medical history of CVA, CAD, diabetes who presents status post 2 instances of acute LOC type event. He states that both happened this morning. One time happened at home and was unwitnessed but the second was at a restaurant and the wife states \"he just went blank\". he arrives alert awake oriented with no reported nausea, vomiting, chest pain, headache    ROS:  At least 12 systems reviewed and otherwise acutely negative except as in the [address] Past Medical History:   Diagnosis Date    [facility]erebral artery occlusion with [facility]     Diabetes mellitus ([facility] [facility]     Heart attack ([facility] [address] [facility]     Hypertension      Past Surgical History:   Procedure Laterality Date    CARDIAC SURGERY      CHOLECYSTECTOMY      HERNIA REPAIR      PTCA       History reviewed. No pertinent family history. Social History     Socioeconomic History    Marital status:      Spouse name: Not on file    Number of children: Not on file    Years of education: Not on file    Highest education level: Not on file   Occupational History    Not on file   Tobacco Use    Smoking status: Never Smoker    Smokeless tobacco: Never Used   Vaping Use    Vaping Use: Never used   Substance and Sexual Activity    Alcohol use: Yes    Drug use: Never    Sexual activity: Never   Other Topics Concern    Not on file   Social History Narrative    Not on file     Social Determinants of Health     Financial Resource Strain:     Difficulty of Paying Living Expenses:    Food Insecurity:     Worried About Running Out of Food in the Last Year:     [address] in the Last Year:    Transportation Needs:     Lack of Transportation (Medical):      Lack of Transportation (Non-Medical): Physical Activity:     Days of Exercise per Week:     Minutes of Exercise per Session:    Stress:     Feeling of Stress :    Social Connections:     Frequency of Communication with Friends and Family:     Frequency of Social Gatherings with Friends and Family:     Attends [employer]oration[employer] Services:     Active Member of Clubs or Organizations:     Attends Club or Organization Meetings:     Marital Status:    Intimate Partner Violence:     Fear of Current or Ex-Partner:     Emotionally Abused:     Physically Abused:     Sexually Abused:      No current facility-administered medications for this encounter.      Current Outpatient Medications   Medication Sig Dispense Refill    ticagrelor (BRILINTA) 90 MG TABS tablet Take 1 tablet by mouth 2 times daily 60 tablet 11    ezetimibe (ZETIA) 10 MG tablet Take 10 mg by mouth daily      JARDIANCE 25 MG tablet Take 12.5 mg by mouth daily      metoprolol tartrate (LOPRESSOR) 25 MG tablet Take 25 mg by mouth 2 times daily      amLODIPine (NORVASC) 10 MG tablet Take 5 mg by mouth daily      atorvastatin (LIPITOR) 80 MG tablet Take 80 mg by mouth nightly       omeprazole (PRILOSEC) 20 MG delayed release capsule Take 20 mg by mouth Daily      metFORMIN (GLUCOPHAGE) 1000 MG tablet Take 1,000 mg by mouth 2 times daily (with meals)      aspirin 81 MG tablet Take 81 mg by mouth nightly       glipiZIDE (GLUCOTROL) 10 MG tablet Take 10 mg by mouth 2 times daily (before meals)      acetaminophen (TYLENOL) 650 MG extended release tablet Take 650 mg by mouth every 8 hours as needed for Pain      Multiple Vitamins-Minerals (THERAPEUTIC MULTIVITAMIN-MINERALS) tablet Take 1 tablet by mouth daily       Allergies   Allergen Reactions    Lovastatin      Confusion      Amoxicillin     Gemfibrozil     Lisinopril Other (See Comments)    Simvastatin Other (See Comments)       [unfilled]    Nursing Notes Reviewed    Physical Exam:  Vitals:    10/07/21 1047   BP: 123/79 Pulse:    Resp:    Temp:    SpO2:        GENERAL APPEARANCE: Awake and alert. Cooperative. No acute distress. HEAD: Normocephalic. Atraumatic. EYES: EOM's grossly intact. Sclera anicteric. ENT: Mucous membranes are moist. Tolerates saliva. No trismus. NECK: Supple. No meningismus. Trachea midline. HEART: RRR. Radial pulses 2+. LUNGS: Respirations unlabored. CTAB  ABDOMEN: Soft. Non-tender. No guarding or rebound. EXTREMITIES: No acute deformities. SKIN: Warm and dry. NEUROLOGICAL: No gross facial drooping. Moves all 4 extremities spontaneously. PSYCHIATRIC: Normal mood.     I have reviewed and interpreted all of the currently available lab results from this visit (if applicable):  Results for orders placed or performed during the hospital encounter of 10/07/21   CBC Auto Differential   Result Value Ref Range    WBC 9.2 4.0 - 11.0 K/uL    RBC 5.01 4.20 - 5.90 M/uL    Hemoglobin 12.2 (L) 13.5 - 17.5 g/dL    Hematocrit 38.8 (L) 40.5 - 52.5 %    MCV 77.4 (L) 80.0 - 100.0 fL    MCH 24.3 (L) 26.0 - 34.0 pg    MCHC 31.4 31.0 - 36.0 g/dL    RDW 18.0 (H) 12.4 - 15.4 %    Platelets 607 614 - 441 K/uL    MPV 9.0 5.0 - 10.5 fL    Neutrophils % 78.3 %    Lymphocytes % 13.6 %    Monocytes % 6.1 %    Eosinophils % 1.4 %    Basophils % 0.6 %    Neutrophils Absolute 7.2 1.7 - 7.7 K/uL    Lymphocytes Absolute 1.3 1.0 - 5.1 K/uL    Monocytes Absolute 0.6 0.0 - 1.3 K/uL    Eosinophils Absolute 0.1 0.0 - 0.6 K/uL    Basophils Absolute 0.1 0.0 - 0.2 K/uL   POCT Glucose   Result Value Ref Range    POC Glucose 171 (H) 70 - 99 mg/dl    Performed on ACCU-CHEK         Radiographs (if obtained):  [] The following radiograph was interpreted by myself in the absence of a radiologist:  [x] Radiologist's Report Reviewed:     XR CHEST PORTABLE (Final result)  Result time 10/07/21 11:39:20  Procedure changed from XR CHEST 1 VIEW  Final result by Ken Romero MD (10/07/21 11:39:20)                Impression:    No evidence of an acute cardiopulmonary process. Narrative:    EXAMINATION:   ONE XRAY VIEW OF THE CHEST     10/7/2021 11:29 am     COMPARISON:   Chest radiograph 02/03/2021     HISTORY:   ORDERING SYSTEM PROVIDED HISTORY: syncope   TECHNOLOGIST PROVIDED HISTORY:   Reason for exam:->syncope   Reason for Exam: syncope     FINDINGS:   Lungs are clear.  No effusion or pneumothorax.  Cardiomediastinal silhouette   is normal with mild tortuosity of the descending thoracic aorta.                     CT HEAD WO CONTRAST (Final result)  Result time 10/07/21 11:38:38  Final result by David Escamilla MD (10/07/21 11:38:38)                Impression:    No acute intracranial abnormality. Narrative:    EXAMINATION:   CT OF THE HEAD WITHOUT CONTRAST  10/7/2021 11:18 am     TECHNIQUE:   CT of the head was performed without the administration of intravenous   contrast. Dose modulation, iterative reconstruction, and/or weight based   adjustment of the mA/kV was utilized to reduce the radiation dose to as low   as reasonably achievable. COMPARISON:   None. HISTORY:   ORDERING SYSTEM PROVIDED HISTORY: syncope   TECHNOLOGIST PROVIDED HISTORY:   Reason for exam:->syncope   Has a \"code stroke\" or \"stroke alert\" been called? ->No   Decision Support Exception - unselect if not a suspected or confirmed   emergency medical condition->Emergency Medical Condition (MA)   Reason for Exam: loc   Acuity: Acute   Type of Exam: Initial     FINDINGS:   BRAIN/VENTRICLES: There is no acute intracranial hemorrhage, mass effect or   midline shift.  No abnormal extra-axial fluid collection.  The gray-white   differentiation is maintained without evidence of an acute infarct.  There is   no evidence of hydrocephalus. There are chronic atrophic changes consistent   with the patient's age. ORBITS: The visualized portion of the orbits demonstrate no acute abnormality.      SINUSES: There is mild maxillary sinus mucosal thickening and a right maxillary mucous retention cyst     SOFT TISSUES/SKULL:  No acute abnormality of the visualized skull or soft   tissues. EKG (if obtained): (All EKG's are interpreted by myself in the absence of a cardiologist)  Initial EKG on my interpretation shows normal sinus rhythm with rate of 89 bpm and no ST segment elevation    MDM:  Differential diagnosis: ACS, TIA, CVA, dysrhythmia, near syncope    Unclear of the etiology as the patient states these 2 events happened in the lasted for a matter of moments. CT of the head unremarkable. EKG/troponin reassuring. Labs show no emergent process. Will admit for further evaluation. Old records reviewed. Labs and imaging reviewed and results discussed with patient. .        Patient was given scripts for the following medications. I counseled patient how to take these medications. New Prescriptions    No medications on file         CRITICAL CARE TIME   Total Critical Care time was 0 minutes, excluding separately reportable procedures. There was a high probability of clinically significant/life threatening deterioration in the patient's condition which required my urgent intervention.       Clinical Impression:  Altered mental status, LOC  (Please note that portions of this note may have been completed with a voice recognition program. Efforts were made to edit the dictations but occasionally words are mis-transcribed.)    MD Zeb Zacarias MD  10/07/21 5343

## 2021-10-07 NOTE — ED NOTES
Pt remains alert and oriented with no acute distress noted. Report called to [facility] RN to assume care. Pain score and pt condition reviewed.       [address] [facility], RN  10/07/21 1932

## 2021-10-08 ENCOUNTER — APPOINTMENT (OUTPATIENT)
Dept: MRI IMAGING | Age: 71
DRG: 392 | End: 2021-10-08
Payer: OTHER GOVERNMENT

## 2021-10-08 LAB
ANION GAP SERPL CALCULATED.3IONS-SCNC: 13 MMOL/L (ref 3–16)
BASOPHILS ABSOLUTE: 0 K/UL (ref 0–0.2)
BASOPHILS RELATIVE PERCENT: 0.7 %
BUN BLDV-MCNC: 15 MG/DL (ref 7–20)
CALCIUM SERPL-MCNC: 8.9 MG/DL (ref 8.3–10.6)
CHLORIDE BLD-SCNC: 105 MMOL/L (ref 99–110)
CO2: 22 MMOL/L (ref 21–32)
CREAT SERPL-MCNC: 1.3 MG/DL (ref 0.8–1.3)
EOSINOPHILS ABSOLUTE: 0.2 K/UL (ref 0–0.6)
EOSINOPHILS RELATIVE PERCENT: 2.9 %
GFR AFRICAN AMERICAN: >60
GFR NON-AFRICAN AMERICAN: 54
GLUCOSE BLD-MCNC: 142 MG/DL (ref 70–99)
GLUCOSE BLD-MCNC: 152 MG/DL (ref 70–99)
GLUCOSE BLD-MCNC: 167 MG/DL (ref 70–99)
GLUCOSE BLD-MCNC: 250 MG/DL (ref 70–99)
GLUCOSE BLD-MCNC: 323 MG/DL (ref 70–99)
HCT VFR BLD CALC: 35.5 % (ref 40.5–52.5)
HEMOGLOBIN: 11 G/DL (ref 13.5–17.5)
LYMPHOCYTES ABSOLUTE: 1.7 K/UL (ref 1–5.1)
LYMPHOCYTES RELATIVE PERCENT: 32.1 %
MCH RBC QN AUTO: 24.1 PG (ref 26–34)
MCHC RBC AUTO-ENTMCNC: 31 G/DL (ref 31–36)
MCV RBC AUTO: 77.9 FL (ref 80–100)
MONOCYTES ABSOLUTE: 0.4 K/UL (ref 0–1.3)
MONOCYTES RELATIVE PERCENT: 7.8 %
NEUTROPHILS ABSOLUTE: 3 K/UL (ref 1.7–7.7)
NEUTROPHILS RELATIVE PERCENT: 56.5 %
PDW BLD-RTO: 17.4 % (ref 12.4–15.4)
PERFORMED ON: ABNORMAL
PLATELET # BLD: 208 K/UL (ref 135–450)
PMV BLD AUTO: 9.1 FL (ref 5–10.5)
POTASSIUM REFLEX MAGNESIUM: 4 MMOL/L (ref 3.5–5.1)
RBC # BLD: 4.56 M/UL (ref 4.2–5.9)
SODIUM BLD-SCNC: 140 MMOL/L (ref 136–145)
WBC # BLD: 5.3 K/UL (ref 4–11)

## 2021-10-08 PROCEDURE — 2580000003 HC RX 258: Performed by: INTERNAL MEDICINE

## 2021-10-08 PROCEDURE — 36415 COLL VENOUS BLD VENIPUNCTURE: CPT

## 2021-10-08 PROCEDURE — 6360000002 HC RX W HCPCS: Performed by: INTERNAL MEDICINE

## 2021-10-08 PROCEDURE — 6370000000 HC RX 637 (ALT 250 FOR IP): Performed by: INTERNAL MEDICINE

## 2021-10-08 PROCEDURE — 80048 BASIC METABOLIC PNL TOTAL CA: CPT

## 2021-10-08 PROCEDURE — A9577 INJ MULTIHANCE: HCPCS | Performed by: INTERNAL MEDICINE

## 2021-10-08 PROCEDURE — 99253 IP/OBS CNSLTJ NEW/EST LOW 45: CPT | Performed by: INTERNAL MEDICINE

## 2021-10-08 PROCEDURE — 6360000004 HC RX CONTRAST MEDICATION: Performed by: INTERNAL MEDICINE

## 2021-10-08 PROCEDURE — 1200000000 HC SEMI PRIVATE

## 2021-10-08 PROCEDURE — 85025 COMPLETE CBC W/AUTO DIFF WBC: CPT

## 2021-10-08 PROCEDURE — 70553 MRI BRAIN STEM W/O & W/DYE: CPT

## 2021-10-08 PROCEDURE — 93880 EXTRACRANIAL BILAT STUDY: CPT

## 2021-10-08 RX ORDER — LORAZEPAM 2 MG/ML
1 INJECTION INTRAMUSCULAR ONCE
Status: COMPLETED | OUTPATIENT
Start: 2021-10-08 | End: 2021-10-08

## 2021-10-08 RX ADMIN — SODIUM CHLORIDE, PRESERVATIVE FREE 10 ML: 5 INJECTION INTRAVENOUS at 08:13

## 2021-10-08 RX ADMIN — TICAGRELOR 90 MG: 90 TABLET ORAL at 21:49

## 2021-10-08 RX ADMIN — ASPIRIN 81 MG: 81 TABLET, CHEWABLE ORAL at 21:49

## 2021-10-08 RX ADMIN — INSULIN LISPRO 6 UNITS: 100 INJECTION, SOLUTION INTRAVENOUS; SUBCUTANEOUS at 18:36

## 2021-10-08 RX ADMIN — AMLODIPINE BESYLATE 5 MG: 5 TABLET ORAL at 08:13

## 2021-10-08 RX ADMIN — PANTOPRAZOLE SODIUM 40 MG: 40 TABLET, DELAYED RELEASE ORAL at 05:07

## 2021-10-08 RX ADMIN — TICAGRELOR 90 MG: 90 TABLET ORAL at 08:13

## 2021-10-08 RX ADMIN — ACETAMINOPHEN 650 MG: 325 TABLET ORAL at 05:07

## 2021-10-08 RX ADMIN — ATORVASTATIN CALCIUM 80 MG: 80 TABLET, FILM COATED ORAL at 21:49

## 2021-10-08 RX ADMIN — ENOXAPARIN SODIUM 40 MG: 40 INJECTION SUBCUTANEOUS at 21:49

## 2021-10-08 RX ADMIN — METOPROLOL TARTRATE 25 MG: 25 TABLET, FILM COATED ORAL at 08:13

## 2021-10-08 RX ADMIN — GADOBENATE DIMEGLUMINE 20 ML: 529 INJECTION, SOLUTION INTRAVENOUS at 18:12

## 2021-10-08 RX ADMIN — INSULIN LISPRO 2 UNITS: 100 INJECTION, SOLUTION INTRAVENOUS; SUBCUTANEOUS at 11:53

## 2021-10-08 RX ADMIN — INSULIN LISPRO 2 UNITS: 100 INJECTION, SOLUTION INTRAVENOUS; SUBCUTANEOUS at 08:16

## 2021-10-08 RX ADMIN — METOPROLOL TARTRATE 25 MG: 25 TABLET, FILM COATED ORAL at 21:49

## 2021-10-08 RX ADMIN — LORAZEPAM 1 MG: 2 INJECTION INTRAMUSCULAR; INTRAVENOUS at 16:22

## 2021-10-08 ASSESSMENT — PAIN DESCRIPTION - LOCATION: LOCATION: HEAD;NECK;JAW

## 2021-10-08 ASSESSMENT — PAIN DESCRIPTION - PAIN TYPE
TYPE: ACUTE PAIN
TYPE: ACUTE PAIN

## 2021-10-08 ASSESSMENT — PAIN SCALES - GENERAL
PAINLEVEL_OUTOF10: 0
PAINLEVEL_OUTOF10: 3
PAINLEVEL_OUTOF10: 0

## 2021-10-08 ASSESSMENT — PAIN DESCRIPTION - DESCRIPTORS: DESCRIPTORS: THROBBING;HEAVINESS

## 2021-10-08 ASSESSMENT — PAIN DESCRIPTION - DIRECTION: RADIATING_TOWARDS: FACE

## 2021-10-08 NOTE — PROGRESS NOTES
4 Eyes Skin Assessment     NAME:  Mani Garcia  YOB: 1950  MEDICAL RECORD NUMBER:  [id number]    The patient is being assess for  Admission    I agree that 2 RN's have performed a thorough Head to Toe Skin Assessment on the patient. ALL assessment sites listed below have been assessed. Areas assessed by both nurses:    Head, Face, Ears, Shoulders, Back, Chest, Arms, Elbows, Hands and Sacrum. Buttock, Coccyx, Ischium        Does the Patient have a Wound?  No noted wound(s)       Price Prevention initiated:  Yes   Wound Care Orders initiated:  No    Pressure Injury (Stage 3,4, Unstageable, DTI, NWPT, and Complex wounds) if present place consult order under [de-identified] No    New and Established Ostomies if present place consult order under : No      Nurse 1 eSignature: Electronically signed by Soila Frey RN on 10/8/21 at 12:16 AM EDT    **SHARE this note so that the co-signing nurse is able to place an eSignature**    Nurse 2 eSignature: {Esignature:[id number]}

## 2021-10-08 NOTE — PROGRESS NOTES
Hospitalist   Progress Note    Patient Name: Christine Luo  PCP: No primary care provider on file. Date of Admission: 10/7/2021    Chief Complaint on Admission: 2 episodes og LOC morning 10/07/2021  Chief diagnosis after evaluation: 2 episodes og LOC morning 10/07/2021    Brief Synopsis: Patient 79 y.o. man with a history of hypertension, hyperlipidemia, type II diabetes mellitus and CAD who was admitted on 10/7/2021 for evaluation and treatment of 2 episodes og LOC morning 10/07/2021    Pt Seen/Examined and Chart Reviewed. Subjective: Pt reports an episode of numbness that began in his cervical vertebrae this morning and quickly spread to his bilateral jaws, \"it felt like I had a Novocain shot. \"  It lasted only a brief time and has not recurred. Objective:   Allergies  Lovastatin, Amoxicillin, Gemfibrozil, Lisinopril, and Simvastatin    Medications    Scheduled Meds:   LORazepam  1 mg IntraVENous Once    ticagrelor  90 mg Oral BID    pantoprazole  40 mg Oral QAM AC    metoprolol tartrate  25 mg Oral BID    atorvastatin  80 mg Oral Nightly    aspirin  81 mg Oral Nightly    amLODIPine  5 mg Oral Daily    sodium chloride flush  10 mL IntraVENous 2 times per day    enoxaparin  40 mg SubCUTAneous Nightly    insulin lispro  0-12 Units SubCUTAneous TID WC    insulin lispro  0-6 Units SubCUTAneous Nightly     Infusions:   sodium chloride      dextrose       PRN Meds:  sodium chloride flush, sodium chloride, ondansetron, polyethylene glycol, acetaminophen **OR** acetaminophen, glucose, dextrose, glucagon (rDNA), dextrose    Physical    VITALS:  /79   Pulse 71   Temp 98.2 °F (36.8 °C) (Oral)   Resp 16   Ht 6' (1.829 m)   Wt 211 lb 10.3 oz (96 kg)   SpO2 94%   BMI 28.70 kg/m²   CONSTITUTIONAL:  WD/WN 79y.o. year-old male who is awake, alert, cooperative, no apparent distress, and appears stated age  EYES:  Lids and lashes normal, PERRL, EOMI, sclera clear, conjunctiva normal  ENT:  NC/AT, MMM    NECK:  Supple, symmetrical, trachea midline, no adenopathy  HEMATOLOGIC/LYMPHATICS:  no cervical, supraclavicular or axillary lymphadenopathy  LUNGS:  clear to auscultation bilaterally, No increased work of breathing, good air exchange, no crackles or wheezing  CARDIOVASCULAR:  Regular rate and rhythm, normal S1 and S2, no S3 or S4, and no significant murmurs, rubs or gallops noted. Normal apical impulse. ABDOMEN:  Normal active bowel sounds, soft, non-tender, non-distended, no masses palpated, no organomegally  MUSCULOSKELETAL:  Full range of motion noted. MENTAL STATUS: Awake, alert, oriented to name, place and time. NEUROLOGIC:  Cranial nerves II-XII are grossly intact. SKIN:  normal skin color, texture, turgor for age.     Data    CBC with Differential:    Lab Results   Component Value Date    WBC 5.3 10/08/2021    HGB 11.0 10/08/2021    HCT 35.5 10/08/2021     10/08/2021    MCV 77.9 10/08/2021    RDW 17.4 10/08/2021    LYMPHOPCT 32.1 10/08/2021    MONOPCT 7.8 10/08/2021    BASOPCT 0.7 10/08/2021    MONOSABS 0.4 10/08/2021    LYMPHSABS 1.7 10/08/2021    EOSABS 0.2 10/08/2021    BASOSABS 0.0 10/08/2021     BMP:    Lab Results   Component Value Date     10/08/2021    K 4.0 10/08/2021     10/08/2021    CO2 22 10/08/2021    BUN 15 10/08/2021    CREATININE 1.3 10/08/2021    GLUCOSE 142 10/08/2021    CALCIUM 8.9 10/08/2021    GFRAA >60 10/08/2021    LABGLOM 54 10/08/2021     LFT:   Lab Results   Component Value Date    ALKPHOS 81 10/07/2021    ALT 16 10/07/2021    AST 12 10/07/2021    PROT 7.1 10/07/2021    BILITOT 0.3 10/07/2021    BILIDIR <0.2 10/07/2021    IBILI see below 10/07/2021     Magnesium:    Lab Results   Component Value Date    MG 2.00 02/20/2020     Phosphorus:    Lab Results   Component Value Date    PHOS 3.0 02/05/2021     PT/INR:  No results found for: PTINR  U/A:    Lab Results   Component Value Date    LEUKOCYTESUR Negative 10/07/2021    WBCUA 0 02/18/2020 RBCUA 1 02/18/2020    SPECGRAV >1.030 10/07/2021    UROBILINOGEN 0.2 10/07/2021    BILIRUBINUR Negative 10/07/2021    BLOODU Negative 10/07/2021    GLUCOSEU >=1000 10/07/2021    PROTEINU Negative 10/07/2021     ABG:  No results found for: PHART, RXC0DOG, D2LJAWOA, JFU9EFA, BEART, THGBART, PO2ART, CQY5KNK        Intake/Output Summary (Last 24 hours) at 10/8/2021 1326  Last data filed at 10/8/2021 0813  Gross per 24 hour   Intake 490 ml   Output --   Net 490 ml       Consults:  IP CONSULT TO [address] Problems    Diagnosis Date Noted    LOC (loss of consciousness) ([facility] [address] [R40.20] 10/07/2021    DMII (diabetes mellitus, type 2) ([facility] [address] [E11.9] 10/07/2021    CAD (coronary artery disease) [I25.10] 10/07/2021    Hyperlipidemia [E78.5] 10/07/2021    Essential hypertension [I10]        ASSESSMENT AND PLAN:    LOC (loss of consciousness) (HCC) - x 2 this morning  - CT of the head unremarkable. EKG/troponin reassuring. Labs show no emergent process. - Reviewed Echocardiogram 03/24/2021  - Carotid dopplers ordered  - MRI brain ordered  - Monitor on Telemetry  - Cardiology consulted     CAD (coronary artery disease) - appears stable. Pt denies chest pain. Continue beta blocker, statin and aspirin, Brilinta. Monitor on telemetry.      Diabetes mellitus II - SSI and carb control diet     Essential (primary) hypertension - continue home meds and monitor blood pressure     Hyperlipidemia - No current evidence of Rhabdomyolysis or other adverse effects. Hold Zetia and continue statin therapy while in the hospital      DVT Prophylaxis: Lovenox  Diet: ADULT DIET;  Regular; 5 carb choices (75 gm/meal)  Code Status: Full Code    PT/OT Eval Status: Not Indicated      Dispo - Anticipated discharge date 0-1 days, depending on Cardiology recommendations    Chery Yi MD

## 2021-10-08 NOTE — PROGRESS NOTES
Patient states he woke up with \"throbbing\" pressure to the back of his neck that radiated towards his cheeks, and felt like he had a \"shot of novocain\" in his jaw. Patient states the numbness went away when he sat up, but the pressure is still in his head/neck.  Tylenol given

## 2021-10-08 NOTE — PROGRESS NOTES
Patient arrived to 4115 via transport and ambulated to bathroom and bed without difficulty. Patient states he had two episodes of LOC but denies any since being in the hospital, but does have a faint headache located to the back of his head that wont go away. Patient encouraged to sit on the side of the bed before standing and to call if he feels dizzy, SOB, or an changes in consciousness. Patient oriented to room, call light, and plan of care.  No telemetry boxes are avalible at this time

## 2021-10-08 NOTE — PROGRESS NOTES
Pt awake in bed. Pt denies any chest pain, nausea, vomiting, dizziness, SOB, and any other pain. Pt reports tightness in back of his neck. Pt denies other needs at present. Call light and item need in reach.  Electronically signed by Mini Queen RN on 10/8/2021 at 10:27 AM

## 2021-10-08 NOTE — PLAN OF CARE
Problem: Falls - Risk of:  Goal: Will remain free from falls  Description: Will remain free from falls  10/8/2021 1027 by Soumya Lock RN  Outcome: Ongoing  Note: Pt free from falls this shift. Non skid socks provided. Pt steady on his feet. Refuses bed alarm. Call light always within reach. Pt able and agreeable to contact for safety appropriately. 10/8/2021 0015 by Marcelino Pittman RN  Outcome: Ongoing     Problem: Pain:  Goal: Pain level will decrease  Description: Pain level will decrease  10/8/2021 1027 by Soumya Lock RN  Outcome: Ongoing  Note: Pt able to express presence and absence of pain using numerical pain scale. Pt pain is managed by PRN analgesics as ordered by MD. Pain reassess after each interventions. Will continue to monitor as needed.      10/8/2021 0015 by Marcelino Pittman RN  Outcome: Ongoing

## 2021-10-08 NOTE — PROGRESS NOTES
MRI safety checklist provided to the pt. Pt reports claustrophobic for MRI. Pt reports he have had needed ativan prior MRI in the past.  Hospitalist. made aware.  Electronically signed by Ray Houston RN on 10/8/2021 at 10:34 AM

## 2021-10-09 VITALS
SYSTOLIC BLOOD PRESSURE: 119 MMHG | HEART RATE: 76 BPM | RESPIRATION RATE: 15 BRPM | OXYGEN SATURATION: 91 % | TEMPERATURE: 97.8 F | HEIGHT: 72 IN | DIASTOLIC BLOOD PRESSURE: 68 MMHG | BODY MASS INDEX: 28.67 KG/M2 | WEIGHT: 211.64 LBS

## 2021-10-09 PROBLEM — N18.30 CKD (CHRONIC KIDNEY DISEASE) STAGE 3, GFR 30-59 ML/MIN (HCC): Status: ACTIVE | Noted: 2021-10-09

## 2021-10-09 LAB
ANION GAP SERPL CALCULATED.3IONS-SCNC: 13 MMOL/L (ref 3–16)
BASOPHILS ABSOLUTE: 0 K/UL (ref 0–0.2)
BASOPHILS RELATIVE PERCENT: 0.8 %
BUN BLDV-MCNC: 15 MG/DL (ref 7–20)
CALCIUM SERPL-MCNC: 9.5 MG/DL (ref 8.3–10.6)
CHLORIDE BLD-SCNC: 103 MMOL/L (ref 99–110)
CO2: 22 MMOL/L (ref 21–32)
CREAT SERPL-MCNC: 1.3 MG/DL (ref 0.8–1.3)
EOSINOPHILS ABSOLUTE: 0.2 K/UL (ref 0–0.6)
EOSINOPHILS RELATIVE PERCENT: 3.1 %
GFR AFRICAN AMERICAN: >60
GFR NON-AFRICAN AMERICAN: 54
GLUCOSE BLD-MCNC: 169 MG/DL (ref 70–99)
GLUCOSE BLD-MCNC: 171 MG/DL (ref 70–99)
HCT VFR BLD CALC: 37.2 % (ref 40.5–52.5)
HEMOGLOBIN: 11.6 G/DL (ref 13.5–17.5)
LYMPHOCYTES ABSOLUTE: 1.8 K/UL (ref 1–5.1)
LYMPHOCYTES RELATIVE PERCENT: 27.2 %
MCH RBC QN AUTO: 24 PG (ref 26–34)
MCHC RBC AUTO-ENTMCNC: 31.1 G/DL (ref 31–36)
MCV RBC AUTO: 77.2 FL (ref 80–100)
MONOCYTES ABSOLUTE: 0.5 K/UL (ref 0–1.3)
MONOCYTES RELATIVE PERCENT: 7.4 %
NEUTROPHILS ABSOLUTE: 4 K/UL (ref 1.7–7.7)
NEUTROPHILS RELATIVE PERCENT: 61.5 %
PDW BLD-RTO: 17.5 % (ref 12.4–15.4)
PERFORMED ON: ABNORMAL
PLATELET # BLD: 209 K/UL (ref 135–450)
PMV BLD AUTO: 8.8 FL (ref 5–10.5)
POTASSIUM REFLEX MAGNESIUM: 4.7 MMOL/L (ref 3.5–5.1)
RBC # BLD: 4.83 M/UL (ref 4.2–5.9)
SODIUM BLD-SCNC: 138 MMOL/L (ref 136–145)
WBC # BLD: 6.5 K/UL (ref 4–11)

## 2021-10-09 PROCEDURE — 2580000003 HC RX 258: Performed by: INTERNAL MEDICINE

## 2021-10-09 PROCEDURE — 36415 COLL VENOUS BLD VENIPUNCTURE: CPT

## 2021-10-09 PROCEDURE — 85025 COMPLETE CBC W/AUTO DIFF WBC: CPT

## 2021-10-09 PROCEDURE — 80048 BASIC METABOLIC PNL TOTAL CA: CPT

## 2021-10-09 PROCEDURE — 6370000000 HC RX 637 (ALT 250 FOR IP): Performed by: INTERNAL MEDICINE

## 2021-10-09 RX ADMIN — INSULIN LISPRO 2 UNITS: 100 INJECTION, SOLUTION INTRAVENOUS; SUBCUTANEOUS at 07:44

## 2021-10-09 RX ADMIN — AMLODIPINE BESYLATE 5 MG: 5 TABLET ORAL at 07:45

## 2021-10-09 RX ADMIN — METOPROLOL TARTRATE 25 MG: 25 TABLET, FILM COATED ORAL at 07:45

## 2021-10-09 RX ADMIN — PANTOPRAZOLE SODIUM 40 MG: 40 TABLET, DELAYED RELEASE ORAL at 05:41

## 2021-10-09 RX ADMIN — TICAGRELOR 90 MG: 90 TABLET ORAL at 07:45

## 2021-10-09 RX ADMIN — SODIUM CHLORIDE, PRESERVATIVE FREE 10 ML: 5 INJECTION INTRAVENOUS at 02:59

## 2021-10-09 RX ADMIN — SODIUM CHLORIDE, PRESERVATIVE FREE 10 ML: 5 INJECTION INTRAVENOUS at 07:49

## 2021-10-09 ASSESSMENT — PAIN SCALES - GENERAL
PAINLEVEL_OUTOF10: 0

## 2021-10-09 NOTE — PLAN OF CARE
Problem: Falls - Risk of:  Goal: Will remain free from falls  Description: Will remain free from falls  10/9/2021 1102 by Beatriz Castaneda RN  Outcome: Ongoing  Note: Pt free from falls this shift. Non skid socks provided. Pt educated on use of call light as needed for assistance. Call light always within reach. Pt able and agreeable to contact for safety appropriately. Problem: Pain:  Goal: Pain level will decrease  Description: Pain level will decrease  10/9/2021 1102 by Beatriz Castaneda RN  Outcome: Ongoing  Note: Pt able to express presence and absence of pain using numerical pain scale. Pt pain is managed by PRN analgesics as ordered by MD. Pain reassess after each interventions. Will continue to monitor as needed.

## 2021-10-09 NOTE — PROGRESS NOTES
Pt alert and oriented x4. Pt denies any pain or discomfort. Pt denies other needs at present. Call light and item need in reach.  Electronically signed by Beatriz Castaneda RN on 10/9/2021 at 11:02 AM  '

## 2021-10-09 NOTE — CARE COORDINATION
CASE MANAGEMENT DISCHARGE SUMMARY:    DISCHARGE DATE: 10/9/21    DISCHARGED TO: Home with spouse; patient reports no needs.      TRANSPORTATION: Wife             TIME: after lunch    PH[address]ACY: [address]    PHONE: [address] AMARILIS [place], [facility], Social Work/Case Management   [phone]  Electronically signed by AMARILIS Naik, FELISHAW on 10/9/2021 at 8:49 AM

## 2021-10-09 NOTE — CONSULTS
Cardiology Consultation     [facility]  1950      Referring Physician: Dr. Rebecca Lizama   Reason for Referral: loss of consciousness   Chief Complaint:   Chief Complaint   Patient presents with    Loss of Consciousness     Pt c/o two instances of blacking out for seconds. States that after he came to after the second time he was confused and did not recognize his wife. Subjective:     History of Present Illness: The patient is 79 y.o. male with a past medical history significant for CAD ( prior LAD PCI ), HTN, HL, DM, CVA who presents with the above complaint. While having dinner suddenly could speak. He was slow to respond to his wife. Symptoms spontaneously resolved. He denies chest pain, PND, orthopnea, dyspnea at rest, palpitations, syncope or edema. Past Medical History:   Diagnosis Date    [facility]erebral artery occlusion with [facility] infarction [facility]     Diabetes mellitus ([facility]     Heart attack ([facility] [facility]     Hypertension      Past Surgical History:   Procedure Laterality Date    CARDIAC SURGERY      CHOLECYSTECTOMY      HERNIA REPAIR      PTCA       History reviewed. No pertinent family history. Social History     Tobacco Use    Smoking status: Never Smoker    Smokeless tobacco: Never Used   Vaping Use    Vaping Use: Never used   Substance Use Topics    Alcohol use:  Yes    Drug use: Never       Allergies   Allergen Reactions    Lovastatin      Confusion      Amoxicillin     Gemfibrozil     Lisinopril Other (See Comments)    Simvastatin Other (See Comments)     Current Facility-Administered Medications   Medication Dose Route Frequency Provider Last Rate Last Admin    ticagrelor (BRILINTA) tablet 90 mg  90 mg Oral BID Fernando Snell MD   90 mg at 10/08/21 0813    pantoprazole (PROTONIX) tablet 40 mg  40 mg Oral QAM AC Fernando Snell MD   40 mg at 10/08/21 0507    metoprolol tartrate (LOPRESSOR) tablet 25 mg  25 mg Oral BID Fernando Snell MD   25 mg at Headaches, hearing loss or vertigo. No mouth sores or sore throat. · Cardiovascular: as reviewed in HPI  · Respiratory: No cough or wheezing, no sputum production. No hemoptysis. · Gastrointestinal: No abdominal pain, appetite loss, blood in stools. No change in bowel or bladder habits. · Genitourinary: No dysuria, trouble voiding, or hematuria. · Musculoskeletal:  No gait disturbance, no joint complaints. · Integumentary: No rash or pruritis. · Neurological: No headache, diplopia, change in muscle strength, numbness or tingling. · Psychiatric: No anxiety or depression. · Endocrine: No temperature intolerance. No excessive thirst, fluid intake, or urination. No tremor. · Hematologic/Lymphatic: No abnormal bruising or bleeding, blood clots or swollen lymph nodes. · Allergic/Immunologic: No nasal congestion or hives. Physical Exam:   /77   Pulse 86   Temp 97.9 °F (36.6 °C) (Oral)   Resp 15   Ht 6' (1.829 m)   Wt 211 lb 10.3 oz (96 kg)   SpO2 93%   BMI 28.70 kg/m²   Wt Readings from Last 3 Encounters:   10/08/21 211 lb 10.3 oz (96 kg)   02/26/21 200 lb (90.7 kg)   02/05/21 205 lb 4 oz (93.1 kg)     Constitutional: He is oriented to person, place, and time. He appears well-developed and well-nourished. In no acute distress. Head: Normocephalic and atraumatic. Pupils equal and round. Neck: Neck supple. No JVP or carotid bruit appreciated. No mass and no thyromegaly present. No lymphadenopathy present. Cardiovascular: Normal rate. Normal heart sounds. Exam reveals no gallop and no friction rub. No murmur heard. Pulmonary/Chest: Effort normal and breath sounds normal. No respiratory distress. He has no wheezes, rhonchi or rales. Abdominal: Soft, non-tender. Bowel sounds are normal. He exhibits no organomegaly, mass or bruit. Extremities: No edema. No cyanosis or clubbing. Pulses are 2+ radial/carotid bilaterally. Neurological: No gross cranial nerve deficit.  Coordination normal. Skin: Skin is warm and dry. There is no rash or diaphoresis. Psychiatric: He has a normal mood and affect. His speech is normal and behavior is normal.     Lab Review:   FLP:    Lab Results   Component Value Date    TRIG 89 02/05/2021    HDL 32 02/05/2021    LDLCALC 38 02/05/2021    LABVLDL 18 02/05/2021     BUN/Creatinine:    Lab Results   Component Value Date    BUN 15 10/08/2021    CREATININE 1.3 10/08/2021     PT/INR, TNI, HGB A1C:   Lab Results   Component Value Date/Time    TROPONINI <0.01 10/07/2021 11:15 AM    LABA1C 8.2 02/03/2021 06:49 PM      No results found for: CBCAUTODIF    EKG Interpretation: NSR, no acute ischemia     Echo:     Stress Test:     Cath:     CT:    Doppler:   No significant carotid disease     All above diagnostic testing and laboratory data was independently visualized and reviewed by me (not simply review of report)       Assessment and Plan   1) Loss of consciousness  - symptoms not consistent with syncope and less likely cardiac   - further work up as per neurology   - if recurs can consider outpatient event monitor     2) CAD   -stable  - c/w asa/brilinta/statin/beta blockade     3) essential HTN   - controlled   - c/w current therapy     Cardiology will sign off, reconsult PRN       Thank you very much for allowing me to participate in the care of your patient. Please do not hesitate to contact me if you have any questions.       Deyanira Mason MD [address], Interventional Cardiology, and Peripheral Vascular Disease   [facility]   Ph: [phone]  Fax: [phone]

## 2021-10-09 NOTE — PROGRESS NOTES
All verbal and written discharge instructions given to the pt at discharge. Pt verbalized understandings and voiced no further questions.  Electronically signed by Marisol Wiley RN on 10/9/2021 at 11:24 AM

## 2021-10-09 NOTE — ACP (ADVANCE CARE PLANNING)
Advanced Care Planning Note. Purpose of Encounter: Advanced care planning in light of CAD  Parties In Attendance: Patient  Decisional Capacity: Yes  Subjective: Patient denies CP, SOB, HA and abdominal pain  Objective: Cr 1.3  Goals of Care Determination: Patient wants full support (CPR, vent, surgery, HD, no trach, PEG)  Plan:  MRI Brain. Carotid US  Code Status: Full code   Time spent on Advanced care Plannin minutes  Advanced Care Planning Documents: Completed advanced directives on chart, wife is the POA.     Kandis Le MD  10/9/2021 6:47 AM

## 2021-10-09 NOTE — DISCHARGE SUMMARY
Hospital Medicine Discharge Summary    Patient: Doc Escamilla     Gender: male  : 1950   Age: 79 y.o. MRN: [id number]    Admitting Physician: Nimisha Parra MD  Discharge Physician: Subha Trujillo MD     Code Status: Full Code     Admit Date: 10/7/2021   Discharge Date:   10/9/21    Disposition:  Home    Discharge Diagnoses: Active Hospital Problems    Diagnosis Date Noted    CKD (chronic kidney disease) stage 3, GFR 30-59 ml/min (HCC) [N18.30] 10/09/2021    LOC (loss of consciousness) ([facility] [address] [facility] [R40.20] 10/07/2021    DMII (diabetes mellitus, type 2) ([facility] [address] [E11.9] 10/07/2021    CAD (coronary artery disease) [I25.10] 10/07/2021    Hyperlipidemia [E78.5] 10/07/2021    Essential hypertension [I10]     Acute gastroenteritis [K52.9] 2020       Follow-up appointments:  one week    Outpatient to do list: F/U with PCP and Cardio. If recurrence of syncope, will need event monitor from Cardiology. PCP needs to check HgA1C    Condition at Discharge:  Stable    Hospital Course:   80 yo M with history of CAD, DM 2, HTN, hyperlipidemia who came to ER with LOC. Admitted as inpatient. CT Head acutely negative in ED. Troponin negative in ED. Followed by Cardiology. [place] no further work up needed from cardiac standpoint. MRI Brain acutely negative for stroke. BL Carotid US with < 15% stenosis. Suspect syncope is secondary to dehydration from gastroenteritis at home. He had solid stool on 10/9/21. F/U with PCP and Cardio.     Discharge Medications:   Current Discharge Medication List        Current Discharge Medication List        Current Discharge Medication List      CONTINUE these medications which have NOT CHANGED    Details   erythromycin (ROMYCIN) 5 MG/GM ophthalmic ointment Apply to eye nightly      ticagrelor (BRILINTA) 90 MG TABS tablet Take 1 tablet by mouth 2 times daily  Qty: 60 tablet, Refills: 11      ezetimibe (ZETIA) 10 MG tablet Take 10 mg by mouth daily      JARDIANCE 25 MG tablet Take 25 mg by mouth daily       metoprolol tartrate (LOPRESSOR) 25 MG tablet Take 25 mg by mouth 2 times daily      amLODIPine (NORVASC) 10 MG tablet Take 5 mg by mouth daily      atorvastatin (LIPITOR) 80 MG tablet Take 80 mg by mouth nightly       omeprazole (PRILOSEC) 20 MG delayed release capsule Take 20 mg by mouth Daily      metFORMIN (GLUCOPHAGE) 1000 MG tablet Take 1,000 mg by mouth 2 times daily (with meals)      aspirin 81 MG tablet Take 81 mg by mouth nightly       glipiZIDE (GLUCOTROL) 10 MG tablet Take 10 mg by mouth 2 times daily (before meals)      Multiple Vitamins-Minerals (THERAPEUTIC MULTIVITAMIN-MINERALS) tablet Take 1 tablet by mouth daily      acetaminophen (TYLENOL) 650 MG extended release tablet Take 650 mg by mouth every 8 hours as needed for Pain           Current Discharge Medication List            Discharge Exam:    /79   Pulse 79   Temp 97.8 °F (36.6 °C) (Oral)   Resp 18   Ht 6' (1.829 m)   Wt 211 lb 10.3 oz (96 kg)   SpO2 95%   BMI 28.70 kg/m²   General appearance:  NAD  HEENT:   Normal cephalic, atraumatic, moist mucous membranes, no oropharyngeal erythema or exudate  Neck: Supple, trachea midline, no anterior cervical or SC LAD  Heart[de-identified] Normal s1/s2, RRR, no murmurs, gallops, or rubs. No leg edema  Lungs:  No use of accessory musclesNormal respiratory effort. Clear to auscultation, bilaterally without Rales/Wheezes/Rhonchi. Abdomen: Soft, non-tender, non-distended, bowel sounds present, no masses  Musculoskeletal:  No clubbing, no cyanosis, no edema  Skin: No lesion or masses  Neurologic:  Neurovascularly intact without any focal sensory/motor deficits. Cranial nerves: II-XII intact, grossly non-focal.  Psychiatric:  A & O x3  Neuro: Grossly intact, moves all four extremities     Labs:  For convenience and continuity at follow-up the following most recent labs are provided:    Lab Results   Component Value Date    WBC 6.5 10/09/2021    HGB 11.6 10/09/2021    HCT 37.2 10/09/2021    MCV 77.2 10/09/2021     10/09/2021     10/09/2021    K 4.7 10/09/2021     10/09/2021    CO2 22 10/09/2021    BUN 15 10/09/2021    CREATININE 1.3 10/09/2021    CALCIUM 9.5 10/09/2021    PHOS 3.0 02/05/2021    ALKPHOS 81 10/07/2021    ALT 16 10/07/2021    AST 12 10/07/2021    BILITOT 0.3 10/07/2021    BILIDIR <0.2 10/07/2021    LABALBU 4.4 10/07/2021    LDLCALC 38 02/05/2021    TRIG 89 02/05/2021     Lab Results   Component Value Date    INR 1.10 02/19/2020       Radiology:  CT HEAD WO CONTRAST    Result Date: 10/7/2021  EXAMINATION: CT OF THE HEAD WITHOUT CONTRAST  10/7/2021 11:18 am TECHNIQUE: CT of the head was performed without the administration of intravenous contrast. Dose modulation, iterative reconstruction, and/or weight based adjustment of the mA/kV was utilized to reduce the radiation dose to as low as reasonably achievable. COMPARISON: None. HISTORY: ORDERING SYSTEM PROVIDED HISTORY: syncope TECHNOLOGIST PROVIDED HISTORY: Reason for exam:->syncope Has a \"code stroke\" or \"stroke alert\" been called? ->No Decision Support Exception - unselect if not a suspected or confirmed emergency medical condition->Emergency Medical Condition (MA) Reason for Exam: loc Acuity: Acute Type of Exam: Initial FINDINGS: BRAIN/VENTRICLES: There is no acute intracranial hemorrhage, mass effect or midline shift. No abnormal extra-axial fluid collection. The gray-white differentiation is maintained without evidence of an acute infarct. There is no evidence of hydrocephalus. There are chronic atrophic changes consistent with the patient's age. ORBITS: The visualized portion of the orbits demonstrate no acute abnormality. SINUSES: There is mild maxillary sinus mucosal thickening and a right maxillary mucous retention cyst SOFT TISSUES/SKULL:  No acute abnormality of the visualized skull or soft tissues. No acute intracranial abnormality.      XR CHEST PORTABLE    Result Date: 10/7/2021  EXAMINATION: ONE XRAY VIEW OF THE CHEST 10/7/2021 11:29 am COMPARISON: Chest radiograph 02/03/2021 HISTORY: ORDERING SYSTEM PROVIDED HISTORY: syncope TECHNOLOGIST PROVIDED HISTORY: Reason for exam:->syncope Reason for Exam: syncope FINDINGS: Lungs are clear. No effusion or pneumothorax. Cardiomediastinal silhouette is normal with mild tortuosity of the descending thoracic aorta. No evidence of an acute cardiopulmonary process. VL DUP CAROTID BILATERAL    Result Date: 10/8/2021  Carotid Duplex Study  Demographics   Patient Name       Augusuts Roca   Date of Study      10/08/2021         Gender              Male   Patient Number     [id number]         Date of Birth       1950   Visit Number       [id number]          Age                 79 year(s)   Accession Number   [id number]         Room Number         6351   Corporate ID       [id number]           Nelly Bautista RVT                                                            CCT   Ordering Physician Ashlie Sanders MD        Physician           Surg                                                            Harmeet Lopez DO  Procedure Type of Study:   Cerebral:Carotid, VL CAROTID DUPLEX BILATERAL. Vascular Sonographer Report  Indications for Study:Syncope. Impressions Right Impression The right internal carotid artery appears to have a 0-15% diameter reducing stenosis based on velocity criteria. The right vertebral artery demonstrates normal antegrade flow. Left Impression The left internal carotid artery appears to have a 0-15% diameter reducing stenosis based on velocity criteria. The left vertebral artery demonstrates normal antegrade flow. Conclusions   Summary   No hemodynamically significant stenosis noted in the internal carotid artery  bilaterally. Antegrade vertebral artery flow bilaterally.    Signature ------------------------------------------------------------------  Electronically signed by Morro Truong DO (Interpreting  physician) on 10/08/2021 at 09:51 PM  ------------------------------------------------------------------  Patient Status:Routine. [facility]. Technical Quality:Good visualization. Risk Factors   - The patient's risk factor(s) include: diabetes mellitus, arterial     hypertension and prior MI . Velocities are measured in cm/s ; Diameters are measured in mm Carotid Right Measurements +---------+----+----+-----+----+ ! Location ! PSV ! EDV ! Angle! RI  ! +---------+----+----+-----+----+ ! Prox CCA !64. 2!10. 5!60   !0.84! +---------+----+----+-----+----+ ! Mid CCA  !68.5!14. 4!60   !0.79! +---------+----+----+-----+----+ ! Dist CCA !70.7!13. 1! 60   !0.81! +---------+----+----+-----+----+ ! Prox ICA !65. 5!15. 7!60   !0.76! +---------+----+----+-----+----+ ! Mid ICA  !73. 3!70. 1! 60   !0.68! +---------+----+----+-----+----+ ! Dist ICA ! 79  !23. 6! 60   !0.7 ! +---------+----+----+-----+----+ ! Prox ECA !89.5!11. 8!60   !0.87! +---------+----+----+-----+----+ ! Vertebral!45.7!12. 9!60   !0.72! +---------+----+----+-----+----+   - There is antegrade vertebral flow noted on the right side. - Additional Measurements:ICAPSV/CCAPSV 1.15. ICAEDV/CCAEDV 2.25. Carotid Left Measurements +---------+----+----+-----+----+ ! Location ! PSV ! EDV ! Angle! RI  ! +---------+----+----+-----+----+ ! Prox CCA !96. 2!17. 6! 60   !0.82! +---------+----+----+-----+----+ ! Mid CCA  !85. 5!13. 8!60   !0.84! +---------+----+----+-----+----+ ! Dist CCA !77.9!11. 3!60   !0.85! +---------+----+----+-----+----+ ! Prox ICA !69.5!17. 7! 60   !0.75! +---------+----+----+-----+----+ ! Mid ICA  !62.9!19. 6!60   !0.69! +---------+----+----+-----+----+ ! Dist ICA !74. 3!21. 6!60   !0.71! +---------+----+----+-----+----+ ! Prox ECA !802 !12. 6!60   !0.9 ! +---------+----+----+-----+----+ ! Vertebral!36.7!9.55!60   !0.74! +---------+----+----+-----+----+   - There is antegrade vertebral flow noted on the left side. - Additional Measurements:ICAPSV/CCAPSV 0.87. ICAEDV/CCAEDV 1.23. MRI BRAIN W WO CONTRAST    Result Date: 10/8/2021  EXAMINATION: MRI OF THE BRAIN WITHOUT AND WITH CONTRAST  10/8/2021 4:28 pm TECHNIQUE: Multiplanar multisequence MRI of the head/brain was performed without and with the administration of intravenous contrast. COMPARISON: Head CT 10/07/2021 HISTORY: ORDERING SYSTEM PROVIDED HISTORY: Loss of consciousness FINDINGS: INTRACRANIAL STRUCTURES/VENTRICLES: No evidence of an acute infarct or other acute parenchymal process. No evidence of acute intracranial hemorrhage. There is no evidence of an intracranial mass or extraaxial fluid collection. No significant mass effect or midline shift. Scattered patchy fociof T2/FLAIR hyperintensity are present within supratentorial white matter which is a nonspecific finding but likely represents mildmicrovascular ischemia. There is mild generalized volume loss. Ventricular enlargement concordant with the degree of parenchymal volume loss. The sellar/suprasellar regions appear unremarkable. The normal signal voids within the major intracranial vessels appear maintained. No evidence of abnormal parenchymal or leptomeningeal enhancement. ORBITS: The visualized portion of the orbits demonstrate no acute abnormality. SINUSES: Small to moderate mucous retention cyst versus polyp within the right maxillary antrum. The remaining visualized paranasal sinuses and mastoid air cells are clear. BONES/SOFT TISSUES: The bone marrow signal intensity appears normal. The soft tissues demonstrate no acute abnormality. 1. No acute intracranial process or abnormal intracranial enhancement. 2. Senescent changes including mild generalized parenchymal volume loss and chronic small vessel ischemia.      The patient was seen and examined on day of discharge and this discharge summary is in conjunction with any daily progress note from day of discharge. Time Spent on discharge is 45 minutes  in the examination, evaluation, counseling and review of medications and discharge plan. Note that more than 30 minutes was spent in preparing discharge papers, discussing discharge with patient, medication review, etc.       Signed:    Emily Stevens MD   10/9/2021      Thank you No primary care provider on file. for the opportunity to be involved in this patient's care.  If you have any questions or concerns please feel free to contact me at [address] [place] [address] [facility]